# Patient Record
Sex: FEMALE | Race: WHITE | NOT HISPANIC OR LATINO | URBAN - METROPOLITAN AREA
[De-identification: names, ages, dates, MRNs, and addresses within clinical notes are randomized per-mention and may not be internally consistent; named-entity substitution may affect disease eponyms.]

---

## 2019-01-24 ENCOUNTER — EMERGENCY (EMERGENCY)
Facility: HOSPITAL | Age: 49
LOS: 1 days | Discharge: HOME | End: 2019-01-24
Attending: EMERGENCY MEDICINE | Admitting: EMERGENCY MEDICINE
Payer: COMMERCIAL

## 2019-01-24 VITALS
TEMPERATURE: 97 F | OXYGEN SATURATION: 100 % | RESPIRATION RATE: 18 BRPM | DIASTOLIC BLOOD PRESSURE: 75 MMHG | HEART RATE: 90 BPM | SYSTOLIC BLOOD PRESSURE: 145 MMHG

## 2019-01-24 VITALS
TEMPERATURE: 98 F | HEIGHT: 65 IN | DIASTOLIC BLOOD PRESSURE: 91 MMHG | WEIGHT: 145.06 LBS | SYSTOLIC BLOOD PRESSURE: 125 MMHG | RESPIRATION RATE: 18 BRPM | OXYGEN SATURATION: 98 % | HEART RATE: 77 BPM

## 2019-01-24 DIAGNOSIS — E86.0 DEHYDRATION: ICD-10-CM

## 2019-01-24 DIAGNOSIS — R55 SYNCOPE AND COLLAPSE: ICD-10-CM

## 2019-01-24 DIAGNOSIS — K52.9 NONINFECTIVE GASTROENTERITIS AND COLITIS, UNSPECIFIED: ICD-10-CM

## 2019-01-24 PROCEDURE — 85025 COMPLETE CBC W/AUTO DIFF WBC: CPT

## 2019-01-24 PROCEDURE — 85730 THROMBOPLASTIN TIME PARTIAL: CPT

## 2019-01-24 PROCEDURE — 84484 ASSAY OF TROPONIN QUANT: CPT

## 2019-01-24 PROCEDURE — 96365 THER/PROPH/DIAG IV INF INIT: CPT

## 2019-01-24 PROCEDURE — 82962 GLUCOSE BLOOD TEST: CPT

## 2019-01-24 PROCEDURE — 80053 COMPREHEN METABOLIC PANEL: CPT

## 2019-01-24 PROCEDURE — 96375 TX/PRO/DX INJ NEW DRUG ADDON: CPT

## 2019-01-24 PROCEDURE — 85610 PROTHROMBIN TIME: CPT

## 2019-01-24 PROCEDURE — 83690 ASSAY OF LIPASE: CPT

## 2019-01-24 PROCEDURE — 99284 EMERGENCY DEPT VISIT MOD MDM: CPT

## 2019-01-24 PROCEDURE — 99284 EMERGENCY DEPT VISIT MOD MDM: CPT | Mod: 25

## 2019-01-24 PROCEDURE — 83735 ASSAY OF MAGNESIUM: CPT

## 2019-01-24 PROCEDURE — 36415 COLL VENOUS BLD VENIPUNCTURE: CPT

## 2019-01-24 RX ORDER — SODIUM CHLORIDE 9 MG/ML
3000 INJECTION INTRAMUSCULAR; INTRAVENOUS; SUBCUTANEOUS ONCE
Qty: 0 | Refills: 0 | Status: COMPLETED | OUTPATIENT
Start: 2019-01-24 | End: 2019-01-24

## 2019-01-24 RX ORDER — FAMOTIDINE 10 MG/ML
1 INJECTION INTRAVENOUS
Qty: 14 | Refills: 0 | OUTPATIENT
Start: 2019-01-24 | End: 2019-01-30

## 2019-01-24 RX ORDER — FAMOTIDINE 10 MG/ML
20 INJECTION INTRAVENOUS ONCE
Qty: 0 | Refills: 0 | Status: COMPLETED | OUTPATIENT
Start: 2019-01-24 | End: 2019-01-24

## 2019-01-24 RX ORDER — ONDANSETRON 8 MG/1
4 TABLET, FILM COATED ORAL ONCE
Qty: 0 | Refills: 0 | Status: COMPLETED | OUTPATIENT
Start: 2019-01-24 | End: 2019-01-24

## 2019-01-24 RX ORDER — ONDANSETRON 8 MG/1
1 TABLET, FILM COATED ORAL
Qty: 12 | Refills: 0 | OUTPATIENT
Start: 2019-01-24 | End: 2019-01-27

## 2019-01-24 RX ORDER — METOCLOPRAMIDE HCL 10 MG
10 TABLET ORAL ONCE
Qty: 0 | Refills: 0 | Status: COMPLETED | OUTPATIENT
Start: 2019-01-24 | End: 2019-01-24

## 2019-01-24 RX ADMIN — Medication 104 MILLIGRAM(S): at 18:26

## 2019-01-24 RX ADMIN — SODIUM CHLORIDE 1500 MILLILITER(S): 9 INJECTION INTRAMUSCULAR; INTRAVENOUS; SUBCUTANEOUS at 18:16

## 2019-01-24 RX ADMIN — SODIUM CHLORIDE 3000 MILLILITER(S): 9 INJECTION INTRAMUSCULAR; INTRAVENOUS; SUBCUTANEOUS at 18:57

## 2019-01-24 RX ADMIN — ONDANSETRON 4 MILLIGRAM(S): 8 TABLET, FILM COATED ORAL at 19:29

## 2019-01-24 RX ADMIN — Medication 10 MILLIGRAM(S): at 18:57

## 2019-01-24 RX ADMIN — FAMOTIDINE 20 MILLIGRAM(S): 10 INJECTION INTRAVENOUS at 18:26

## 2019-01-24 NOTE — ED PROVIDER NOTE - CARE PLAN
Principal Discharge DX:	Gastroenteritis  Secondary Diagnosis:	Dehydration  Secondary Diagnosis:	Vasovagal syncope

## 2019-01-24 NOTE — ED PROVIDER NOTE - ENMT, MLM
Airway patent, Nasal mucosa clear. Mouth with very dry mucosa. no tongue lac. Throat has no vesicles, no oropharyngeal exudates and uvula is midline.

## 2019-01-24 NOTE — ED ADULT TRIAGE NOTE - CHIEF COMPLAINT QUOTE
"I felt dizzy and passed out around 4PM. I've been having diarrhea and vomiting since this morning."

## 2019-01-24 NOTE — ED PROVIDER NOTE - ATTENDING CONTRIBUTION TO CARE
48F woke up w/ diarrhea and 30 bm today, n/v x5, syncopize in bathroom during vausea. Hot/cold, vision dark, found on floor, out brief perior by  at home. IVF & electrolyte repletion. Dry MM on exam. No focal pain or e/o trauma. unlikely seizure.

## 2019-01-24 NOTE — ED PROVIDER NOTE - NSFOLLOWUPINSTRUCTIONS_ED_ALL_ED_FT
Viral Gastroenteritis, Adult  Viral gastroenteritis is also known as the stomach flu. This condition is caused by certain germs (viruses). These germs can be passed from person to person very easily (are very contagious). This condition can cause sudden watery poop (diarrhea), fever, and throwing up (vomiting).  Having watery poop and throwing up can make you feel weak and cause you to get dehydrated. Dehydration can make you tired and thirsty, make you have a dry mouth, and make it so you pee (urinate) less often. Older adults and people with other diseases or a weak defense system (immune system) are at higher risk for dehydration. It is important to replace the fluids that you lose from having watery poop and throwing up.  Follow these instructions at home:  Follow instructions from your doctor about how to care for yourself at home.  Eating and drinking   Image   Follow these instructions as told by your doctor:  Take an oral rehydration solution (ORS). This is a drink that is sold at pharmacies and stores.  Drink clear fluids in small amounts as you are able, such as:  Water.  Ice chips.  Diluted fruit juice.  Low-calorie sports drinks.  Eat bland, easy-to-digest foods in small amounts as you are able, such as:  Bananas.  Applesauce.  Rice.  Low-fat (lean) meats.  Toast.  Crackers.  Avoid fluids that have a lot of sugar or caffeine in them.  Avoid alcohol.  Avoid spicy or fatty foods.  General instructions   Drink enough fluid to keep your pee (urine) clear or pale yellow.  Wash your hands often. If you cannot use soap and water, use hand .  Make sure that all people in your home wash their hands well and often.  Rest at home while you get better.  Take over-the-counter and prescription medicines only as told by your doctor.  Watch your condition for any changes.  Take a warm bath to help with any burning or pain from having watery poop.  Keep all follow-up visits as told by your doctor. This is important.  Contact a doctor if:  You cannot keep fluids down.  Your symptoms get worse.  You have new symptoms.  You feel light-headed or dizzy.  You have muscle cramps.  Get help right away if:  You have chest pain.  You feel very weak or you pass out (faint).  You see blood in your throw-up.  Your throw-up looks like coffee grounds.  You have bloody or black poop (stools) or poop that look like tar.  You have a very bad headache, a stiff neck, or both.  You have a rash.  You have very bad pain, cramping, or bloating in your belly (abdomen).  You have trouble breathing.  You are breathing very quickly.  Your heart is beating very quickly.  Your skin feels cold and clammy.  You feel confused.  You have pain when you pee.  You have signs of dehydration, such as:  Dark pee, hardly any pee, or no pee.  Cracked lips.  Dry mouth.  Sunken eyes.  Sleepiness.  Weakness.

## 2019-01-24 NOTE — ED PROVIDER NOTE - MEDICAL DECISION MAKING DETAILS
pt w/n/v/d then had vasovagal syncope episode while in bathroom, no sz activity, clinically dehydrated, given ivf and antiemetics and h2 blocker, labs w/hemoconcentration, suspect gastroenteritis resulted in dehydration that caused vasovagal event, no suspicion for card etiology or dissection, abd soft/nt - do not suspect acute intra abd process, pt much improved w/ivf

## 2019-01-24 NOTE — ED ADULT TRIAGE NOTE - OTHER COMPLAINTS
Pt is A&O x3, able to speak coherently in full sentences, no facial droop, no arm drift. Pt C/O headache. Pt denies chest pain, SOB, dizziness at this time.

## 2019-01-24 NOTE — ED PROVIDER NOTE - OBJECTIVE STATEMENT
The pt is a 47 y/o F, BIBA, c/o n/v/d and passed out - pt states that woke up and started to have diarrhea, first loose stools then watery, had ~30 BMs, non bloody, then started to get nauseous and vomited x5, non bloody, non bilious, was in the bathroom and started to feel hot and broke out in cold sweat, vision got dark and she passed out on floor - according to , was out x few min - no sz activity, then came back and now feels like herself.  was sick w/same symptoms a few d ago. Denies fevers, chills, abd pain, cp, sob, h/a, head injury, dysuria, any other c/o

## 2020-01-08 NOTE — ED PROVIDER NOTE - GASTROINTESTINAL, MLM
Abdomen soft, non-tender, no guarding. no rebound Bexarotene Counseling:  I discussed with the patient the risks of bexarotene including but not limited to hair loss, dry lips/skin/eyes, liver abnormalities, hyperlipidemia, pancreatitis, depression/suicidal ideation, photosensitivity, drug rash/allergic reactions, hypothyroidism, anemia, leukopenia, infection, cataracts, and teratogenicity.  Patient understands that they will need regular blood tests to check lipid profile, liver function tests, white blood cell count, thyroid function tests and pregnancy test if applicable.

## 2023-04-09 ENCOUNTER — APPOINTMENT (EMERGENCY)
Dept: RADIOLOGY | Facility: HOSPITAL | Age: 53
DRG: 418 | End: 2023-04-09
Payer: COMMERCIAL

## 2023-04-09 ENCOUNTER — APPOINTMENT (EMERGENCY)
Dept: RADIOLOGY | Facility: HOSPITAL | Age: 53
DRG: 418 | End: 2023-04-09
Attending: EMERGENCY MEDICINE
Payer: COMMERCIAL

## 2023-04-09 ENCOUNTER — HOSPITAL ENCOUNTER (INPATIENT)
Facility: HOSPITAL | Age: 53
LOS: 4 days | Discharge: HOME | DRG: 418 | End: 2023-04-13
Attending: EMERGENCY MEDICINE | Admitting: SURGERY
Payer: COMMERCIAL

## 2023-04-09 DIAGNOSIS — K80.00 ACUTE CALCULOUS CHOLECYSTITIS: Primary | ICD-10-CM

## 2023-04-09 DIAGNOSIS — K81.9 CHOLECYSTITIS: ICD-10-CM

## 2023-04-09 LAB
ALBUMIN SERPL-MCNC: 4.3 G/DL (ref 3.4–5)
ALP SERPL-CCNC: 59 IU/L (ref 35–126)
ALT SERPL-CCNC: 27 IU/L (ref 11–54)
AMORPH CRY #/AREA URNS HPF: 3 /HPF
ANION GAP SERPL CALC-SCNC: 7 MEQ/L (ref 3–15)
AST SERPL-CCNC: 25 IU/L (ref 15–41)
BACTERIA URNS QL MICRO: ABNORMAL /HPF
BASOPHILS # BLD: 0.01 K/UL (ref 0.01–0.1)
BASOPHILS NFR BLD: 0.1 %
BILIRUB SERPL-MCNC: 0.6 MG/DL (ref 0.3–1.2)
BILIRUB UR QL STRIP.AUTO: NEGATIVE MG/DL
BUN SERPL-MCNC: 11 MG/DL (ref 8–20)
CALCIUM SERPL-MCNC: 9.3 MG/DL (ref 8.9–10.3)
CHLORIDE SERPL-SCNC: 98 MEQ/L (ref 98–109)
CLARITY UR REFRACT.AUTO: ABNORMAL
CO2 SERPL-SCNC: 28 MEQ/L (ref 22–32)
COLOR UR AUTO: YELLOW
CREAT SERPL-MCNC: 0.7 MG/DL (ref 0.6–1.1)
DIFFERENTIAL METHOD BLD: ABNORMAL
EOSINOPHIL # BLD: 0.02 K/UL (ref 0.04–0.36)
EOSINOPHIL NFR BLD: 0.2 %
ERYTHROCYTE [DISTWIDTH] IN BLOOD BY AUTOMATED COUNT: 12.6 % (ref 11.7–14.4)
GFR SERPL CREATININE-BSD FRML MDRD: >60 ML/MIN/1.73M*2
GLUCOSE SERPL-MCNC: 130 MG/DL (ref 70–99)
GLUCOSE UR STRIP.AUTO-MCNC: NEGATIVE MG/DL
HCG UR QL: NEGATIVE
HCT VFR BLDCO AUTO: 41.6 % (ref 35–45)
HGB BLD-MCNC: 13.7 G/DL (ref 11.8–15.7)
HGB UR QL STRIP.AUTO: NEGATIVE
HYALINE CASTS #/AREA URNS LPF: ABNORMAL /LPF
IMM GRANULOCYTES # BLD AUTO: 0.03 K/UL (ref 0–0.08)
IMM GRANULOCYTES NFR BLD AUTO: 0.3 %
KETONES UR STRIP.AUTO-MCNC: NEGATIVE MG/DL
LEUKOCYTE ESTERASE UR QL STRIP.AUTO: NEGATIVE
LIPASE SERPL-CCNC: 39 U/L (ref 20–51)
LYMPHOCYTES # BLD: 1.49 K/UL (ref 1.2–3.5)
LYMPHOCYTES NFR BLD: 13.2 %
MCH RBC QN AUTO: 29.3 PG (ref 28–33.2)
MCHC RBC AUTO-ENTMCNC: 32.9 G/DL (ref 32.2–35.5)
MCV RBC AUTO: 88.9 FL (ref 83–98)
MONOCYTES # BLD: 0.49 K/UL (ref 0.28–0.8)
MONOCYTES NFR BLD: 4.4 %
MUCOUS THREADS URNS QL MICRO: ABNORMAL /LPF
NEUTROPHILS # BLD: 9.21 K/UL (ref 1.7–7)
NEUTS SEG NFR BLD: 81.8 %
NITRITE UR QL STRIP.AUTO: NEGATIVE
NRBC BLD-RTO: 0 %
PDW BLD AUTO: 9.5 FL (ref 9.4–12.3)
PH UR STRIP.AUTO: 7.5 [PH]
PLATELET # BLD AUTO: 257 K/UL (ref 150–369)
POTASSIUM SERPL-SCNC: 3.7 MEQ/L (ref 3.6–5.1)
PROT SERPL-MCNC: 7.8 G/DL (ref 6–8.2)
PROT UR QL STRIP.AUTO: ABNORMAL
RBC # BLD AUTO: 4.68 M/UL (ref 3.93–5.22)
RBC #/AREA URNS HPF: ABNORMAL /HPF
SODIUM SERPL-SCNC: 133 MEQ/L (ref 136–144)
SP GR UR REFRACT.AUTO: 1.01
SQUAMOUS URNS QL MICRO: ABNORMAL /HPF
UROBILINOGEN UR STRIP-ACNC: 0.2 EU/DL
WBC # BLD AUTO: 11.25 K/UL (ref 3.8–10.5)
WBC #/AREA URNS HPF: ABNORMAL /HPF

## 2023-04-09 PROCEDURE — 84703 CHORIONIC GONADOTROPIN ASSAY: CPT

## 2023-04-09 PROCEDURE — 81003 URINALYSIS AUTO W/O SCOPE: CPT

## 2023-04-09 PROCEDURE — 12000000 HC ROOM AND CARE MED/SURG

## 2023-04-09 PROCEDURE — 93005 ELECTROCARDIOGRAM TRACING: CPT

## 2023-04-09 PROCEDURE — 63600105 HC IODINE BASED CONTRAST

## 2023-04-09 PROCEDURE — 82040 ASSAY OF SERUM ALBUMIN: CPT | Performed by: EMERGENCY MEDICINE

## 2023-04-09 PROCEDURE — 76705 ECHO EXAM OF ABDOMEN: CPT

## 2023-04-09 PROCEDURE — 85025 COMPLETE CBC W/AUTO DIFF WBC: CPT | Performed by: EMERGENCY MEDICINE

## 2023-04-09 PROCEDURE — 36415 COLL VENOUS BLD VENIPUNCTURE: CPT | Performed by: EMERGENCY MEDICINE

## 2023-04-09 PROCEDURE — 96374 THER/PROPH/DIAG INJ IV PUSH: CPT | Mod: 59

## 2023-04-09 PROCEDURE — 63600000 HC DRUGS/DETAIL CODE: Performed by: SURGERY

## 2023-04-09 PROCEDURE — 96375 TX/PRO/DX INJ NEW DRUG ADDON: CPT | Mod: 59

## 2023-04-09 PROCEDURE — G1004 CDSM NDSC: HCPCS

## 2023-04-09 PROCEDURE — 63600000 HC DRUGS/DETAIL CODE

## 2023-04-09 PROCEDURE — 80053 COMPREHEN METABOLIC PANEL: CPT | Performed by: EMERGENCY MEDICINE

## 2023-04-09 PROCEDURE — 83690 ASSAY OF LIPASE: CPT | Performed by: EMERGENCY MEDICINE

## 2023-04-09 PROCEDURE — 25800000 HC PHARMACY IV SOLUTIONS

## 2023-04-09 PROCEDURE — 99285 EMERGENCY DEPT VISIT HI MDM: CPT | Mod: 25

## 2023-04-09 RX ORDER — KETOROLAC TROMETHAMINE 15 MG/ML
15 INJECTION, SOLUTION INTRAMUSCULAR; INTRAVENOUS ONCE
Status: COMPLETED | OUTPATIENT
Start: 2023-04-09 | End: 2023-04-09

## 2023-04-09 RX ORDER — ONDANSETRON HYDROCHLORIDE 2 MG/ML
4 INJECTION, SOLUTION INTRAVENOUS ONCE
Status: COMPLETED | OUTPATIENT
Start: 2023-04-09 | End: 2023-04-10

## 2023-04-09 RX ORDER — ONDANSETRON HYDROCHLORIDE 2 MG/ML
4 INJECTION, SOLUTION INTRAVENOUS EVERY 8 HOURS PRN
Status: DISCONTINUED | OUTPATIENT
Start: 2023-04-09 | End: 2023-04-12

## 2023-04-09 RX ORDER — DEXTROSE 50 % IN WATER (D50W) INTRAVENOUS SYRINGE
25 AS NEEDED
Status: DISCONTINUED | OUTPATIENT
Start: 2023-04-09 | End: 2023-04-13 | Stop reason: HOSPADM

## 2023-04-09 RX ORDER — IBUPROFEN 200 MG
16-32 TABLET ORAL AS NEEDED
Status: DISCONTINUED | OUTPATIENT
Start: 2023-04-09 | End: 2023-04-13 | Stop reason: HOSPADM

## 2023-04-09 RX ORDER — MORPHINE SULFATE 2 MG/ML
2 INJECTION, SOLUTION INTRAMUSCULAR; INTRAVENOUS ONCE
Status: COMPLETED | OUTPATIENT
Start: 2023-04-09 | End: 2023-04-10

## 2023-04-09 RX ORDER — ACETAMINOPHEN 325 MG/1
650 TABLET ORAL EVERY 4 HOURS PRN
Status: DISCONTINUED | OUTPATIENT
Start: 2023-04-09 | End: 2023-04-13 | Stop reason: HOSPADM

## 2023-04-09 RX ORDER — DEXTROSE MONOHYDRATE AND SODIUM CHLORIDE 5; .9 G/100ML; G/100ML
INJECTION, SOLUTION INTRAVENOUS CONTINUOUS
Status: ACTIVE | OUTPATIENT
Start: 2023-04-09 | End: 2023-04-11

## 2023-04-09 RX ORDER — HYDROMORPHONE HYDROCHLORIDE 1 MG/ML
0.25 INJECTION, SOLUTION INTRAMUSCULAR; INTRAVENOUS; SUBCUTANEOUS
Status: DISCONTINUED | OUTPATIENT
Start: 2023-04-09 | End: 2023-04-13 | Stop reason: HOSPADM

## 2023-04-09 RX ORDER — METRONIDAZOLE 500 MG/100ML
500 INJECTION, SOLUTION INTRAVENOUS
Status: DISCONTINUED | OUTPATIENT
Start: 2023-04-09 | End: 2023-04-13

## 2023-04-09 RX ORDER — ONDANSETRON HYDROCHLORIDE 2 MG/ML
4 INJECTION, SOLUTION INTRAVENOUS ONCE
Status: COMPLETED | OUTPATIENT
Start: 2023-04-09 | End: 2023-04-09

## 2023-04-09 RX ORDER — DEXTROSE 40 %
15-30 GEL (GRAM) ORAL AS NEEDED
Status: DISCONTINUED | OUTPATIENT
Start: 2023-04-09 | End: 2023-04-13 | Stop reason: HOSPADM

## 2023-04-09 RX ORDER — METRONIDAZOLE 500 MG/100ML
500 INJECTION, SOLUTION INTRAVENOUS ONCE
Status: DISCONTINUED | OUTPATIENT
Start: 2023-04-09 | End: 2023-04-09

## 2023-04-09 RX ADMIN — KETOROLAC TROMETHAMINE 15 MG: 15 INJECTION, SOLUTION INTRAMUSCULAR; INTRAVENOUS at 18:53

## 2023-04-09 RX ADMIN — SODIUM CHLORIDE 1000 ML: 9 INJECTION, SOLUTION INTRAVENOUS at 18:53

## 2023-04-09 RX ADMIN — HYDROMORPHONE HYDROCHLORIDE 0.25 MG: 1 INJECTION, SOLUTION INTRAMUSCULAR; INTRAVENOUS; SUBCUTANEOUS at 22:29

## 2023-04-09 RX ADMIN — IOHEXOL 100 ML: 350 INJECTION, SOLUTION INTRAVENOUS at 19:48

## 2023-04-09 RX ADMIN — ONDANSETRON 4 MG: 2 INJECTION INTRAMUSCULAR; INTRAVENOUS at 18:58

## 2023-04-09 ASSESSMENT — ENCOUNTER SYMPTOMS
ABDOMINAL PAIN: 1
BACK PAIN: 1
HEMATURIA: 0
ABDOMINAL DISTENTION: 1
CHILLS: 0
FEVER: 0
DYSURIA: 0
FLANK PAIN: 0
VOMITING: 1
SHORTNESS OF BREATH: 0
DIARRHEA: 0
FREQUENCY: 0
NAUSEA: 1
COLOR CHANGE: 0

## 2023-04-09 NOTE — ED ATTESTATION NOTE
Procedures  Physical Exam  Review of Systems    2023  6:08 PM  I have personally seen and examined the patient.  I personally performed the key components of the encounter and provided a substantive portion of the care and medical decision making for this patient. I reviewed and agree with the PA/NP/Resident's assessment and plan of care, with any exceptions as documented below    My focused history, examination, assessment, and plan of care of Karon Moore is as follows:    The patient is a 52 y.o. who comes to the ED for abdominal pain, nausea, vomiting.  No diarrhea but did feel some constipation today.  No fever, chills.  Remote history of  but no other intra-abdominal surgeries.  Pain was initially in the epigastric area but now more in the right upper quadrant.  Did not eat anything unusual.  Spouse ate similar food yesterday and is asymptomatic.  Patient also did travel to [facility] about 2 weeks ago but was asymptomatic from that trip.  No chest pain, difficulty breathing.      Pertinent past medical history includes: Remote history of breast cancer      Exam: Awake, alert, uncomfortable but nontoxic-appearing.  Afebrile.  Heart rate regular.  Abdomen soft, flat, mildly tender to palpation in the right upper quadrant without a Rodarte's.  No McBurney's.  No peritoneal findings.      Impression/Plan/Medical decision making/ED course: CT with gallstones and early cholecystitis.  Ultrasound shows cholecystitis as well.  Antibiotics ordered.  Results discussed with general surgery who admit to their service.             Vital Signs Review: Vital signs have been reviewed. The oxygen saturation is SpO2: 97 %  which is normal.      I was physically present for the key/critical portions of the following procedures: None    NOTE: Patient seen during a time of significantly increased volumes, decreased capacity and staff. Portion of management and initial evaluation may have been done while in the waiting  room because of this. This document was created using dragon dictation software.  There might be some typographical errors due to this technology.         Marcie Laura MD  04/09/23 0915

## 2023-04-09 NOTE — ED PROVIDER NOTES
Emergency Medicine Note  HPI   HISTORY OF PRESENT ILLNESS       History provided by:  Patient and medical records   used: No      52-year-old female with PMH of breast cancer s/p mastectomy presents to the ED via private vehicle for evaluation of abdominal pain x1 day.  Patient reports 4 episodes of vomiting.  No blood.  She states she feels diffuse abdominal discomfort and bloatingwith associated low back pain.  History of .  Denies fever, chest pain, shortness of breath, diarrhea, dysuria, hematuria, flank pain, frequency, vaginal bleeding, vaginal discharge.  No bowel/bladder incontinence.  No saddle anesthesia.  Patient is menopausal.      Patient History   PAST HISTORY     Reviewed from Nursing Triage:  Tobacco  Allergies  Meds  Problems  Med Hx  Surg Hx  Fam Hx  Soc   Hx      Past Medical History:   Diagnosis Date   • Breast cancer (CMS/HCC)        Past Surgical History:   Procedure Laterality Date   •  SECTION     • MASTECTOMY Bilateral        History reviewed. No pertinent family history.    Social History     Tobacco Use   • Smoking status: Never   • Smokeless tobacco: Never   Substance Use Topics   • Alcohol use: Yes     Comment: social   • Drug use: Never         Review of Systems   REVIEW OF SYSTEMS     Review of Systems   Constitutional: Negative for chills and fever.   Respiratory: Negative for shortness of breath.    Cardiovascular: Negative for chest pain.   Gastrointestinal: Positive for abdominal distention, abdominal pain, nausea and vomiting. Negative for diarrhea.   Genitourinary: Negative for dysuria, flank pain, frequency, hematuria, vaginal bleeding and vaginal discharge.   Musculoskeletal: Positive for back pain.   Skin: Negative for color change.         VITALS     ED Vitals    Date/Time Temp Pulse Resp BP SpO2 [facility]   23 1858 -- 60 17 150/94 97 % W   23 1804 36.9 °C (98.5 °F) 72 18 171/93 97 % LC        Pulse Ox %: 97 % (23  1809)  Pulse Ox Interpretation: Normal (04/09/23 1809)  Heart Rate: 72 (04/09/23 1809)  Rhythm Strip Interpretation: Normal Sinus Rhythm (04/09/23 1833)     Physical Exam   PHYSICAL EXAM     Physical Exam  Vitals and nursing note reviewed.   Constitutional:       General: She is not in acute distress.     Appearance: She is well-developed. She is not toxic-appearing.   HENT:      Head: Normocephalic and atraumatic.   Eyes:      Conjunctiva/sclera: Conjunctivae normal.   Cardiovascular:      Rate and Rhythm: Normal rate and regular rhythm.   Pulmonary:      Effort: Pulmonary effort is normal. No respiratory distress.   Abdominal:      General: Bowel sounds are normal. There is no distension.      Palpations: Abdomen is soft.      Tenderness: There is generalized abdominal tenderness. There is no right CVA tenderness, left CVA tenderness, guarding or rebound.   Musculoskeletal:         General: No deformity.      Comments: No vertebral tenderness.  No step-off/deformity.  No crepitus.   Skin:     General: Skin is warm and dry.   Neurological:      General: No focal deficit present.      Mental Status: She is alert and oriented to person, place, and time.   Psychiatric:         Mood and Affect: Mood normal.         Behavior: Behavior normal.           PROCEDURES     Procedures     DATA     Results     Procedure Component Value Units Date/Time    BhCG, Serum, Qual [[id number]] Collected: 04/09/23 1809    Specimen: Blood, Venous Updated: 04/09/23 1900    UA with reflex culture [[id number]]  (Abnormal) Collected: 04/09/23 1827    Specimen: Urine, Clean Catch Updated: 04/09/23 1842    Narrative:      The following orders were created for panel order UA with reflex culture.  Procedure                               Abnormality         Status                     ---------                               -----------         ------                     UA Reflex to Culture (Ma...[[id number]]  Abnormal            Final result                UA Microscopic[[id number]]               Abnormal            Final result                 Please view results for these tests on the individual orders.    UA Microscopic [[id number]]  (Abnormal) Collected: 04/09/23 1827    Specimen: Urine, Clean Catch Updated: 04/09/23 1842     RBC, Urine 0 TO 4 /HPF      WBC, Urine 0 TO 3 /HPF      Squamous Epithelial Rare /hpf      Hyaline Cast None Seen /lpf      Bacteria, Urine None Seen /HPF      AMORPHOUS CRYSTALS +3 /hpf      Mucus Rare /LPF     UA Reflex to Culture (Macroscopic) [[id number]]  (Abnormal) Collected: 04/09/23 1827    Specimen: Urine, Clean Catch Updated: 04/09/23 1840     Color, Urine Yellow     Clarity, Urine Cloudy     Specific Gravity, Urine 1.015     pH, Urine 7.5     Leukocyte Esterase Negative     Comment: Results can be falsely negative due to high specific gravity, some antibiotics, glucose >3 g/dl, or WBC other than neutrophils.        Nitrite, Urine Negative     Protein, Urine Trace     Comment: Trace False Positive Protein can be seen with alkaline or highly buffered urines or urine with high specific gravity. Suggest clinical correlation.        Glucose, Urine Negative mg/dL      Ketones, Urine Negative mg/dL      Urobilinogen, Urine 0.2 EU/dL      Bilirubin, Urine Negative mg/dL      Blood, Urine Negative     Comment: The sensitivity of the occult blood test is equivalent to approximately 4 intact RBC/HPF.       Comprehensive metabolic panel [[id number]]  (Abnormal) Collected: 04/09/23 1809    Specimen: Blood, Venous Updated: 04/09/23 1835     Sodium 133 mEQ/L      Potassium 3.7 mEQ/L      Comment: Results obtained on plasma. Plasma Potassium values may be up to 0.4 mEQ/L less than serum values. The differences may be greater for patients with high platelet or white cell counts.        Chloride 98 mEQ/L      CO2 28 mEQ/L      BUN 11 mg/dL      Creatinine 0.7 mg/dL      Glucose 130 mg/dL      Calcium 9.3 mg/dL      AST (SGOT) 25 IU/L      ALT  (SGPT) 27 IU/L      Alkaline Phosphatase 59 IU/L      Total Protein 7.8 g/dL      Comment: Test performed on plasma which typically contains approximately 0.4 g/dL more protein than serum.        Albumin 4.3 g/dL      Bilirubin, Total 0.6 mg/dL      eGFR >60.0 mL/min/1.73m*2      Anion Gap 7 mEQ/L     Narrative:      Order only if pain is above umbilicus    Lipase, if pain is above umbilicus [[id number]]  (Normal) Collected: 04/09/23 1809    Specimen: Blood, Venous Updated: 04/09/23 1835     Lipase 39 U/L     Narrative:      Order only if pain is above umbilicus    CBC and differential [[id number]]  (Abnormal) Collected: 04/09/23 1809    Specimen: Blood, Venous Updated: 04/09/23 1816     WBC 11.25 K/uL      RBC 4.68 M/uL      Hemoglobin 13.7 g/dL      Hematocrit 41.6 %      MCV 88.9 fL      MCH 29.3 pg      MCHC 32.9 g/dL      RDW 12.6 %      Platelets 257 K/uL      MPV 9.5 fL      Differential Type Auto     nRBC 0.0 %      Immature Granulocytes 0.3 %      Neutrophils 81.8 %      Lymphocytes 13.2 %      Monocytes 4.4 %      Eosinophils 0.2 %      Basophils 0.1 %      Immature Granulocytes, Absolute 0.03 K/uL      Neutrophils, Absolute 9.21 K/uL      Lymphocytes, Absolute 1.49 K/uL      Monocytes, Absolute 0.49 K/uL      Eosinophils, Absolute 0.02 K/uL      Basophils, Absolute 0.01 K/uL     RAINBOW RED [[id number]] Collected: 04/09/23 1809    Specimen: Blood, Venous Updated: 04/09/23 1813    RAINBOW LT BLUE [[id number]] Collected: 04/09/23 1809    Specimen: Blood, Venous Updated: 04/09/23 1813    Rea Draw Panel [[id number]] Collected: 04/09/23 1809    Specimen: Blood, Venous Updated: 04/09/23 1813    Narrative:      The following orders were created for panel order Rea Draw Panel.  Procedure                               Abnormality         Status                     ---------                               -----------         ------                     RAINBOW RED[[id number]]                                      In  process                 BRYANT NUÑEZ[[id number]]                                  In process                 BRYANT KENA[[id number]]                                     In process                   Please view results for these tests on the individual orders.    RAINBOW GOLD [[id number]] Collected: 04/09/23 1809    Specimen: Blood, Venous Updated: 04/09/23 1813          Imaging Results    None         ECG 12 lead   ED Interpretation   Interpreted by myself and ED attending.  NSR.  Rate 71 bpm.  No STEMI.          Scoring tools                                  ED Course & MDM   MDM / ED COURSE / CLINICAL IMPRESSION / DISPO     Medical Decision Making  52-year-old female with complaint of generalized abdominal pain and vomiting.  Will check labs and imaging.  Consider gastritis, gastroenteritis, bowel obstruction, cholecystitis, appendicitis, diverticulitis.  EKG nonischemic.  Blood work notable for mildly elevated WBC.    Amount and/or Complexity of Data Reviewed  Labs: ordered. Decision-making details documented in ED Course.  Radiology: ordered.  ECG/medicine tests: ordered and independent interpretation performed.      Risk  Prescription drug management.          ED Course as of 04/09/23 2213   Sun Apr 09, 2023   1822 WBC(!): 11.25 [SG]   1835 Patient declines pain/nausea meds at this time [SG]   1835 Impression: Abdominal pain    Plan: Labs, Lipase, UA, EKG, Imaging, monitor [SG]   1942 Sign out to ED attending who will follow up on CT result and make appropriate disposition [SG]   2056 CT with cholelithiasis with early cholecystitis.  Patient notes significant proved in symptoms and wants to go home however after discussion of CT results, patient agreeable to stay for further evaluation.  Right upper quadrant ultrasound ordered.  Will discuss with general surgery following this. [DW]   2211 US with cholecystitis; gen surg paged. Abx ordered. Pain returning; pt agreeable to morphine now. [DW]   2212 Discussed  with Dr Rangel for admission [DW]      ED Course User Index  [DW] Marcie Laura MD  [SG] Awilda Fry PA C     Clinical Impression      None               Awilda Fry PA C  04/09/23 1948

## 2023-04-10 LAB
ALBUMIN SERPL-MCNC: 3.7 G/DL (ref 3.4–5)
ALP SERPL-CCNC: 54 IU/L (ref 35–126)
ALT SERPL-CCNC: 25 IU/L (ref 11–54)
ANION GAP SERPL CALC-SCNC: 6 MEQ/L (ref 3–15)
AST SERPL-CCNC: 28 IU/L (ref 15–41)
ATRIAL RATE: 71
BILIRUB SERPL-MCNC: 0.3 MG/DL (ref 0.3–1.2)
BUN SERPL-MCNC: 7 MG/DL (ref 8–20)
CALCIUM SERPL-MCNC: 8.4 MG/DL (ref 8.9–10.3)
CHLORIDE SERPL-SCNC: 106 MEQ/L (ref 98–109)
CO2 SERPL-SCNC: 24 MEQ/L (ref 22–32)
CREAT SERPL-MCNC: 0.6 MG/DL (ref 0.6–1.1)
ERYTHROCYTE [DISTWIDTH] IN BLOOD BY AUTOMATED COUNT: 12.8 % (ref 11.7–14.4)
GFR SERPL CREATININE-BSD FRML MDRD: >60 ML/MIN/1.73M*2
GLUCOSE SERPL-MCNC: 120 MG/DL (ref 70–99)
HCT VFR BLDCO AUTO: 40.1 % (ref 35–45)
HGB BLD-MCNC: 13.1 G/DL (ref 11.8–15.7)
MCH RBC QN AUTO: 29.3 PG (ref 28–33.2)
MCHC RBC AUTO-ENTMCNC: 32.7 G/DL (ref 32.2–35.5)
MCV RBC AUTO: 89.7 FL (ref 83–98)
P AXIS: 60
PDW BLD AUTO: 9.5 FL (ref 9.4–12.3)
PLATELET # BLD AUTO: 215 K/UL (ref 150–369)
POTASSIUM SERPL-SCNC: 3.3 MEQ/L (ref 3.6–5.1)
PR INTERVAL: 158
PROT SERPL-MCNC: 6.3 G/DL (ref 6–8.2)
QRS DURATION: 92
QT INTERVAL: 412
QTC CALCULATION(BAZETT): 447
R AXIS: 54
RBC # BLD AUTO: 4.47 M/UL (ref 3.93–5.22)
SODIUM SERPL-SCNC: 136 MEQ/L (ref 136–144)
T WAVE AXIS: 56
VENTRICULAR RATE: 71
WBC # BLD AUTO: 10.02 K/UL (ref 3.8–10.5)

## 2023-04-10 PROCEDURE — 25000000 HC PHARMACY GENERAL: Performed by: SURGERY

## 2023-04-10 PROCEDURE — 25800000 HC PHARMACY IV SOLUTIONS: Performed by: SURGERY

## 2023-04-10 PROCEDURE — 85027 COMPLETE CBC AUTOMATED: CPT | Performed by: SURGERY

## 2023-04-10 PROCEDURE — 36415 COLL VENOUS BLD VENIPUNCTURE: CPT | Performed by: SURGERY

## 2023-04-10 PROCEDURE — 63600000 HC DRUGS/DETAIL CODE: Performed by: EMERGENCY MEDICINE

## 2023-04-10 PROCEDURE — 63700000 HC SELF-ADMINISTRABLE DRUG: Performed by: SURGERY

## 2023-04-10 PROCEDURE — 80053 COMPREHEN METABOLIC PANEL: CPT | Performed by: SURGERY

## 2023-04-10 PROCEDURE — 63600000 HC DRUGS/DETAIL CODE: Performed by: SURGERY

## 2023-04-10 PROCEDURE — 12000000 HC ROOM AND CARE MED/SURG

## 2023-04-10 RX ORDER — KETOROLAC TROMETHAMINE 30 MG/ML
30 INJECTION, SOLUTION INTRAMUSCULAR; INTRAVENOUS ONCE AS NEEDED
Status: COMPLETED | OUTPATIENT
Start: 2023-04-10 | End: 2023-04-10

## 2023-04-10 RX ORDER — DOCUSATE SODIUM 100 MG/1
100 CAPSULE, LIQUID FILLED ORAL DAILY
Status: DISCONTINUED | OUTPATIENT
Start: 2023-04-11 | End: 2023-04-10

## 2023-04-10 RX ORDER — BUTALBITAL, ACETAMINOPHEN AND CAFFEINE 50; 325; 40 MG/1; MG/1; MG/1
1 TABLET ORAL ONCE
Status: COMPLETED | OUTPATIENT
Start: 2023-04-10 | End: 2023-04-10

## 2023-04-10 RX ORDER — POTASSIUM CHLORIDE 750 MG/1
40 TABLET, EXTENDED RELEASE ORAL ONCE
Status: COMPLETED | OUTPATIENT
Start: 2023-04-10 | End: 2023-04-10

## 2023-04-10 RX ORDER — DOCUSATE SODIUM 100 MG/1
100 CAPSULE, LIQUID FILLED ORAL DAILY
Status: DISCONTINUED | OUTPATIENT
Start: 2023-04-10 | End: 2023-04-11

## 2023-04-10 RX ADMIN — DEXTROSE AND SODIUM CHLORIDE: 5; 900 INJECTION, SOLUTION INTRAVENOUS at 00:30

## 2023-04-10 RX ADMIN — ONDANSETRON 4 MG: 2 INJECTION INTRAMUSCULAR; INTRAVENOUS at 00:17

## 2023-04-10 RX ADMIN — POTASSIUM CHLORIDE 40 MEQ: 750 TABLET, EXTENDED RELEASE ORAL at 11:09

## 2023-04-10 RX ADMIN — MORPHINE SULFATE 2 MG: 2 INJECTION, SOLUTION INTRAMUSCULAR; INTRAVENOUS at 00:17

## 2023-04-10 RX ADMIN — ONDANSETRON HYDROCHLORIDE 4 MG: 2 SOLUTION INTRAMUSCULAR; INTRAVENOUS at 08:56

## 2023-04-10 RX ADMIN — METRONIDAZOLE 500 MG: 500 INJECTION, SOLUTION INTRAVENOUS at 08:18

## 2023-04-10 RX ADMIN — HYDROMORPHONE HYDROCHLORIDE 0.25 MG: 1 INJECTION, SOLUTION INTRAMUSCULAR; INTRAVENOUS; SUBCUTANEOUS at 14:54

## 2023-04-10 RX ADMIN — HYDROMORPHONE HYDROCHLORIDE 0.25 MG: 1 INJECTION, SOLUTION INTRAMUSCULAR; INTRAVENOUS; SUBCUTANEOUS at 11:17

## 2023-04-10 RX ADMIN — CEFTRIAXONE SODIUM 1 G: 1 INJECTION, POWDER, FOR SOLUTION INTRAMUSCULAR; INTRAVENOUS at 21:50

## 2023-04-10 RX ADMIN — BUTALBITAL, ACETAMINOPHEN, AND CAFFEINE 1 TABLET: 50; 325; 40 TABLET ORAL at 16:05

## 2023-04-10 RX ADMIN — ONDANSETRON HYDROCHLORIDE 4 MG: 2 SOLUTION INTRAMUSCULAR; INTRAVENOUS at 16:56

## 2023-04-10 RX ADMIN — HYDROMORPHONE HYDROCHLORIDE 0.25 MG: 1 INJECTION, SOLUTION INTRAMUSCULAR; INTRAVENOUS; SUBCUTANEOUS at 02:33

## 2023-04-10 RX ADMIN — HYDROMORPHONE HYDROCHLORIDE 0.25 MG: 1 INJECTION, SOLUTION INTRAMUSCULAR; INTRAVENOUS; SUBCUTANEOUS at 21:48

## 2023-04-10 RX ADMIN — HYDROMORPHONE HYDROCHLORIDE 0.25 MG: 1 INJECTION, SOLUTION INTRAMUSCULAR; INTRAVENOUS; SUBCUTANEOUS at 05:52

## 2023-04-10 RX ADMIN — CEFTRIAXONE SODIUM 1 G: 1 INJECTION, POWDER, FOR SOLUTION INTRAMUSCULAR; INTRAVENOUS at 00:31

## 2023-04-10 RX ADMIN — KETOROLAC TROMETHAMINE 30 MG: 30 INJECTION, SOLUTION INTRAMUSCULAR; INTRAVENOUS at 08:52

## 2023-04-10 RX ADMIN — METRONIDAZOLE 500 MG: 500 INJECTION, SOLUTION INTRAVENOUS at 01:04

## 2023-04-10 RX ADMIN — HYDROMORPHONE HYDROCHLORIDE 0.25 MG: 1 INJECTION, SOLUTION INTRAMUSCULAR; INTRAVENOUS; SUBCUTANEOUS at 02:12

## 2023-04-10 RX ADMIN — METRONIDAZOLE 500 MG: 500 INJECTION, SOLUTION INTRAVENOUS at 16:58

## 2023-04-10 ASSESSMENT — COGNITIVE AND FUNCTIONAL STATUS - GENERAL
STANDING UP FROM CHAIR USING ARMS: 4 - NONE
WALKING IN HOSPITAL ROOM: 4 - NONE
MOVING TO AND FROM BED TO CHAIR: 4 - NONE
MOVING TO AND FROM BED TO CHAIR: 4 - NONE
STANDING UP FROM CHAIR USING ARMS: 4 - NONE
CLIMB 3 TO 5 STEPS WITH RAILING: 4 - NONE
CLIMB 3 TO 5 STEPS WITH RAILING: 4 - NONE
WALKING IN HOSPITAL ROOM: 4 - NONE

## 2023-04-10 NOTE — H&P
General Surgery Admission H & P    Subjective     Karon Moore is a 52 y.o. female visiting from [facility] with a past medical history including microinvasive lymph node positive, ER/IN pos invasive ductal/lobular breast cancer, for which she underwent bilateral mastectomy, chemotherapy, and adjuvant endocrine therapy (currently on tamoxifen) 8 years ago at [facility], who developed generalized abdominal pain, discomfort, bloating, vomiting which then became more of a band-like pressure across her upper abdomen starting 2 days ago and progressively worsening. She ultimately came to the ED where she was found to have mild leukocytosis and signs of early acute calculous cholecystitis on both CT a/p and ultrasound. She continues to have discomfort this a.m. but is presently controlled with pain meds. She has had previous similar symptoms.    Medical History:   Past Medical History:   Diagnosis Date   • Breast cancer (CMS/HCC)        Surgical History:   Past Surgical History:   Procedure Laterality Date   •  SECTION     • MASTECTOMY Bilateral        Social History:   Social History     Social History Narrative   • Not on file       Family History: History reviewed. No pertinent family history. 3 sisters and both parents have had gallbladders removed.    Allergies: Sulfa (sulfonamide antibiotics)    Home Medications:      Current Medications:  •  acetaminophen, 650 mg, oral, q4h PRN  •  cefTRIAXone, 1 g, intravenous, q24h INT  •  D5 % and 0.9 % sodium chloride, , intravenous, Continuous  •  glucose, 16-32 g of dextrose, oral, PRN **OR** dextrose, 15-30 g of dextrose, oral, PRN **OR** glucagon, 1 mg, intramuscular, PRN **OR** dextrose 50 % in water (D50), 25 mL, intravenous, PRN  •  HYDROmorphone, 0.25 mg, intravenous, q3h PRN  •  metroNIDAZOLE, 500 mg, intravenous, q8h INT  •  ondansetron, 4 mg, intravenous, q8h PRN  •  potassium chloride, 40 mEq, oral, Once    Review of Systems  Pertinent items are noted  "in HPI.    Objective     Physicial Exam  Visit Vitals  /71 (BP Location: Left upper arm, Patient Position: Lying)   Pulse 75   Temp 36.7 °C (98.1 °F) (Oral)   Resp 18   Ht 1.626 m (5' 4\")   Wt 69.4 kg (153 lb)   SpO2 96%   Breastfeeding No   BMI 26.26 kg/m²       General appearance: alert, appears stated age and cooperative. Well developed, well nourished middle aged woman in no acute distress or obvious discomfort.  Head: normocephalic, without obvious abnormality, atraumatic  Eyes: conjunctivae/corneas clear. PERRL, EOM's grossly intact. Sclerae nonicteric.  Neck: trachea midline   Lungs: Respirations nonlabored. No tachypnea, cough, use of accessory respiratory muscles.   Abdomen: Soft, mildly distended. Mild tenderness to palpation epigastrium and RUQ. No guarding, rebound.   Rectal: deferred  Extremities: extremities normal, warm and well-perfused; no cyanosis, clubbing, or edema  Skin: Skin color, texture, turgor normal. No rashes or lesions  Neurologic: Grossly normal    Labs  CBC Results       04/10/23 04/09/23     0344 1809    WBC 10.02 11.25    RBC 4.47 4.68    HGB 13.1 13.7    HCT 40.1 41.6    MCV 89.7 88.9    MCH 29.3 29.3    MCHC 32.7 32.9     257        CMP Results       04/10/23 04/09/23     0344 1809     133    K 3.3 3.7    Cl 106 98    CO2 24 28    Glucose 120 130    BUN 7 11    Creatinine 0.6 0.7    Calcium 8.4 9.3    Anion Gap 6 7    AST 28 25    ALT 25 27    Albumin 3.7 4.3    EGFR >60.0 >60.0         Comment for K at 1809 on 04/09/23: Results obtained on plasma. Plasma Potassium values may be up to 0.4 mEQ/L less than serum values. The differences may be greater for patients with high platelet or white cell counts.            Imaging  U/s:   IMPRESSION:   Cholelithiasis with constellation of findings highly suggestive of acute   cholecystitis.                                   CT a/p:   IMPRESSION:   1. Cholelithiasis with suspected early cholecystitis.   2. Suggestion of 1.4 " cm right ovarian cyst, likely representing a residual   physiologic follicle, but should be correlated with precautionary surveillance   pelvic ultrasound.       Assessment and Plan  Acute calculous cholecystitis with continued symptoms.   -- IV abx.  -- To OR today for lap champ.    Ashlie Rangel MD

## 2023-04-10 NOTE — PLAN OF CARE
Problem: Adult Inpatient Plan of Care  Goal: Plan of Care Review  Flowsheets (Taken 4/10/2023 1410)  Progress: improving  Plan of Care Reviewed With: patient  Outcome Summary: NPO for OR this afternoon. continues on IVF.  at bedside - updated on plan of care. Continues on IV ABT. IV toradol and iv dilaudid administered for pain, iv zofran administered for nausea with a positive effect.   Plan of Care Review  Plan of Care Reviewed With: patient  Progress: improving  Outcome Summary: NPO for OR this afternoon. continues on IVF.  at bedside - updated on plan of care. Continues on IV ABT. IV toradol and iv dilaudid administered for pain, iv zofran administered for nausea with a positive effect.

## 2023-04-10 NOTE — PLAN OF CARE
Care Coordination Admission Assessment Note  Date: 4/10/2023   Time: 3:24 PM    Patient Name: Karon Moore  Medical Record Number: [id number]  YOB: 1970  Room/BED: 0323D    General Information  Patient-Specific Goals (Include Timeframe)     PCP: [facility], No Pcp   Insurance Coverage: AETNA  Readmission Within the last 30 days  no previous admission in last 30 days    Advance Directives (for Healthcare)?  Does patient have advance directive?: No  Patient does not have Advance Directive: Patient/Family declines further information  Does patient have current OOH DNR form?: No  Patient does not have current OOH DNR form: Patient/Family declines further information  Does patient have current POLST?: No  Patient does not have current POLST: Patient/Family declines further information  Does patient have mental health advance directive?: No  Patient does not have Mental Health Advance Directive: Patient/Family declines further information       Living Arrangements  Current Living Arrangements: home  People in Home: spouse  Able to Return to Prior Arrangements: yes    Housing Stability and Financial Resources (SDOH)  In the last 12 months, was there a time when you were not able to pay the mortgage or rent on time?: No  In the last 12 months, was there a time when you did not have a steady place to sleep or slept in a shelter (including now)?: No  How hard is it for you to pay for the very basics like food, housing, medical care, and heating?: Not hard at all    Assistive Device/Animal Currently Used at Home  none    Anticipated Changes Related to Illness  none    Transportation Concerns (SDOH)  Transportation Concerns: car, none  In the past 12 months, has lack of transportation kept you from medical appointments or from getting medications?: No  In the past 12 months, has lack of transportation kept you from meetings, work, or from getting things needed for daily living?: No      Anticipated Discharge  Plan   Anticipated Discharge Disposition: home with assistance, home without assistance or services  Met with patient. Provided education and contact information for Care Coordination services.: yes  Screening complete: yes    Chart reviewed, pt for possible lap champ.

## 2023-04-11 ENCOUNTER — ANESTHESIA EVENT (INPATIENT)
Dept: OPERATING ROOM | Facility: HOSPITAL | Age: 53
DRG: 418 | End: 2023-04-11
Payer: COMMERCIAL

## 2023-04-11 ENCOUNTER — ANESTHESIA (INPATIENT)
Dept: OPERATING ROOM | Facility: HOSPITAL | Age: 53
DRG: 418 | End: 2023-04-11
Payer: COMMERCIAL

## 2023-04-11 PROCEDURE — 88304 TISSUE EXAM BY PATHOLOGIST: CPT | Performed by: SURGERY

## 2023-04-11 PROCEDURE — 25000000 HC PHARMACY GENERAL: Performed by: NURSE ANESTHETIST, CERTIFIED REGISTERED

## 2023-04-11 PROCEDURE — A4648 IMPLANTABLE TISSUE MARKER: HCPCS | Performed by: SURGERY

## 2023-04-11 PROCEDURE — 0FT44ZZ RESECTION OF GALLBLADDER, PERCUTANEOUS ENDOSCOPIC APPROACH: ICD-10-PCS | Performed by: SURGERY

## 2023-04-11 PROCEDURE — 63700000 HC SELF-ADMINISTRABLE DRUG: Performed by: PHYSICIAN ASSISTANT

## 2023-04-11 PROCEDURE — 63600000 HC DRUGS/DETAIL CODE: Performed by: SURGERY

## 2023-04-11 PROCEDURE — 25800000 HC PHARMACY IV SOLUTIONS: Performed by: SURGERY

## 2023-04-11 PROCEDURE — 63700000 HC SELF-ADMINISTRABLE DRUG: Performed by: SURGERY

## 2023-04-11 PROCEDURE — 25000000 HC PHARMACY GENERAL: Performed by: SURGERY

## 2023-04-11 PROCEDURE — 36000004 HC OR LEVEL 4 INITIAL 30MIN: Performed by: SURGERY

## 2023-04-11 PROCEDURE — 25800000 HC PHARMACY IV SOLUTIONS: Performed by: NURSE ANESTHETIST, CERTIFIED REGISTERED

## 2023-04-11 PROCEDURE — 63600000 HC DRUGS/DETAIL CODE: Performed by: NURSE ANESTHETIST, CERTIFIED REGISTERED

## 2023-04-11 PROCEDURE — 12000000 HC ROOM AND CARE MED/SURG

## 2023-04-11 PROCEDURE — 63700000 HC SELF-ADMINISTRABLE DRUG: Performed by: ANESTHESIOLOGY

## 2023-04-11 PROCEDURE — 27200000 HC STERILE SUPPLY: Performed by: SURGERY

## 2023-04-11 PROCEDURE — 36000014 HC OR LEVEL 4 EA ADDL MIN: Performed by: SURGERY

## 2023-04-11 PROCEDURE — 71000001 HC PACU PHASE 1 INITIAL 30MIN: Performed by: SURGERY

## 2023-04-11 PROCEDURE — 37000001 HC ANESTHESIA GENERAL: Performed by: SURGERY

## 2023-04-11 RX ORDER — SODIUM CHLORIDE, SODIUM GLUCONATE, SODIUM ACETATE, POTASSIUM CHLORIDE AND MAGNESIUM CHLORIDE 30; 37; 368; 526; 502 MG/100ML; MG/100ML; MG/100ML; MG/100ML; MG/100ML
INJECTION, SOLUTION INTRAVENOUS CONTINUOUS PRN
Status: DISCONTINUED | OUTPATIENT
Start: 2023-04-11 | End: 2023-04-11 | Stop reason: SURG

## 2023-04-11 RX ORDER — KETOROLAC TROMETHAMINE 30 MG/ML
30 INJECTION, SOLUTION INTRAMUSCULAR; INTRAVENOUS EVERY 6 HOURS PRN
Status: DISCONTINUED | OUTPATIENT
Start: 2023-04-11 | End: 2023-04-13 | Stop reason: HOSPADM

## 2023-04-11 RX ORDER — IBUPROFEN 200 MG
16-32 TABLET ORAL AS NEEDED
Status: DISCONTINUED | OUTPATIENT
Start: 2023-04-11 | End: 2023-04-11

## 2023-04-11 RX ORDER — FENTANYL CITRATE 50 UG/ML
50 INJECTION, SOLUTION INTRAMUSCULAR; INTRAVENOUS EVERY 5 MIN PRN
Status: DISCONTINUED | OUTPATIENT
Start: 2023-04-11 | End: 2023-04-11

## 2023-04-11 RX ORDER — PROPOFOL 10 MG/ML
INJECTION, EMULSION INTRAVENOUS AS NEEDED
Status: DISCONTINUED | OUTPATIENT
Start: 2023-04-11 | End: 2023-04-11 | Stop reason: SURG

## 2023-04-11 RX ORDER — BUPIVACAINE HYDROCHLORIDE AND EPINEPHRINE 5; 5 MG/ML; UG/ML
INJECTION, SOLUTION EPIDURAL; INTRACAUDAL; PERINEURAL
Status: DISCONTINUED | OUTPATIENT
Start: 2023-04-11 | End: 2023-04-11 | Stop reason: HOSPADM

## 2023-04-11 RX ORDER — HYDROMORPHONE HYDROCHLORIDE 1 MG/ML
0.5 INJECTION, SOLUTION INTRAMUSCULAR; INTRAVENOUS; SUBCUTANEOUS
Status: DISCONTINUED | OUTPATIENT
Start: 2023-04-11 | End: 2023-04-11

## 2023-04-11 RX ORDER — OXYCODONE HYDROCHLORIDE 5 MG/1
5 TABLET ORAL EVERY 4 HOURS PRN
Status: DISCONTINUED | OUTPATIENT
Start: 2023-04-11 | End: 2023-04-13 | Stop reason: HOSPADM

## 2023-04-11 RX ORDER — LIDOCAINE HYDROCHLORIDE 10 MG/ML
INJECTION, SOLUTION INFILTRATION; PERINEURAL AS NEEDED
Status: DISCONTINUED | OUTPATIENT
Start: 2023-04-11 | End: 2023-04-11 | Stop reason: SURG

## 2023-04-11 RX ORDER — ROCURONIUM BROMIDE 10 MG/ML
INJECTION, SOLUTION INTRAVENOUS AS NEEDED
Status: DISCONTINUED | OUTPATIENT
Start: 2023-04-11 | End: 2023-04-11 | Stop reason: SURG

## 2023-04-11 RX ORDER — SCOPOLAMINE 1 MG/3D
1 PATCH, EXTENDED RELEASE TRANSDERMAL
Status: DISCONTINUED | OUTPATIENT
Start: 2023-04-11 | End: 2023-04-11 | Stop reason: HOSPADM

## 2023-04-11 RX ORDER — DEXTROSE 50 % IN WATER (D50W) INTRAVENOUS SYRINGE
25 AS NEEDED
Status: DISCONTINUED | OUTPATIENT
Start: 2023-04-11 | End: 2023-04-11

## 2023-04-11 RX ORDER — ONDANSETRON HYDROCHLORIDE 2 MG/ML
INJECTION, SOLUTION INTRAVENOUS AS NEEDED
Status: DISCONTINUED | OUTPATIENT
Start: 2023-04-11 | End: 2023-04-11 | Stop reason: SURG

## 2023-04-11 RX ORDER — FAMOTIDINE 10 MG/ML
INJECTION INTRAVENOUS AS NEEDED
Status: DISCONTINUED | OUTPATIENT
Start: 2023-04-11 | End: 2023-04-11 | Stop reason: SURG

## 2023-04-11 RX ORDER — DEXTROSE 40 %
15-30 GEL (GRAM) ORAL AS NEEDED
Status: DISCONTINUED | OUTPATIENT
Start: 2023-04-11 | End: 2023-04-11

## 2023-04-11 RX ORDER — DEXAMETHASONE SODIUM PHOSPHATE 4 MG/ML
INJECTION, SOLUTION INTRA-ARTICULAR; INTRALESIONAL; INTRAMUSCULAR; INTRAVENOUS; SOFT TISSUE AS NEEDED
Status: DISCONTINUED | OUTPATIENT
Start: 2023-04-11 | End: 2023-04-11 | Stop reason: SURG

## 2023-04-11 RX ORDER — FENTANYL CITRATE 50 UG/ML
INJECTION, SOLUTION INTRAMUSCULAR; INTRAVENOUS AS NEEDED
Status: DISCONTINUED | OUTPATIENT
Start: 2023-04-11 | End: 2023-04-11 | Stop reason: SURG

## 2023-04-11 RX ORDER — DEXTROSE MONOHYDRATE AND SODIUM CHLORIDE 5; .9 G/100ML; G/100ML
INJECTION, SOLUTION INTRAVENOUS CONTINUOUS
Status: DISCONTINUED | OUTPATIENT
Start: 2023-04-11 | End: 2023-04-12

## 2023-04-11 RX ORDER — HYDROMORPHONE HYDROCHLORIDE 1 MG/ML
INJECTION, SOLUTION INTRAMUSCULAR; INTRAVENOUS; SUBCUTANEOUS AS NEEDED
Status: DISCONTINUED | OUTPATIENT
Start: 2023-04-11 | End: 2023-04-11 | Stop reason: SURG

## 2023-04-11 RX ORDER — MIDAZOLAM HYDROCHLORIDE 2 MG/2ML
INJECTION, SOLUTION INTRAMUSCULAR; INTRAVENOUS AS NEEDED
Status: DISCONTINUED | OUTPATIENT
Start: 2023-04-11 | End: 2023-04-11 | Stop reason: SURG

## 2023-04-11 RX ADMIN — METRONIDAZOLE 500 MG: 500 INJECTION, SOLUTION INTRAVENOUS at 00:53

## 2023-04-11 RX ADMIN — FAMOTIDINE 20 MG: 10 INJECTION INTRAVENOUS at 13:23

## 2023-04-11 RX ADMIN — ONDANSETRON 4 MG: 2 INJECTION INTRAMUSCULAR; INTRAVENOUS at 14:27

## 2023-04-11 RX ADMIN — SCOPALAMINE 1 PATCH: 1 PATCH, EXTENDED RELEASE TRANSDERMAL at 12:51

## 2023-04-11 RX ADMIN — HYDROMORPHONE HYDROCHLORIDE 0.5 MG: 1 INJECTION, SOLUTION INTRAMUSCULAR; INTRAVENOUS; SUBCUTANEOUS at 14:16

## 2023-04-11 RX ADMIN — DOCUSATE SODIUM 100 MG: 100 CAPSULE, LIQUID FILLED ORAL at 00:53

## 2023-04-11 RX ADMIN — CEFTRIAXONE SODIUM 1 G: 1 INJECTION, POWDER, FOR SOLUTION INTRAMUSCULAR; INTRAVENOUS at 21:50

## 2023-04-11 RX ADMIN — ROCURONIUM BROMIDE 50 MG: 10 SOLUTION INTRAVENOUS at 13:15

## 2023-04-11 RX ADMIN — ACETAMINOPHEN 650 MG: 325 TABLET ORAL at 09:59

## 2023-04-11 RX ADMIN — PROPOFOL 200 MG: 10 INJECTION, EMULSION INTRAVENOUS at 13:15

## 2023-04-11 RX ADMIN — SODIUM CHLORIDE, SODIUM GLUCONATE, SODIUM ACETATE, POTASSIUM CHLORIDE AND MAGNESIUM CHLORIDE: 526; 502; 368; 37; 30 INJECTION, SOLUTION INTRAVENOUS at 13:10

## 2023-04-11 RX ADMIN — FENTANYL CITRATE 50 MCG: 50 INJECTION, SOLUTION INTRAMUSCULAR; INTRAVENOUS at 13:15

## 2023-04-11 RX ADMIN — KETOROLAC TROMETHAMINE 30 MG: 30 INJECTION, SOLUTION INTRAMUSCULAR at 20:27

## 2023-04-11 RX ADMIN — HYDROMORPHONE HYDROCHLORIDE 0.25 MG: 1 INJECTION, SOLUTION INTRAMUSCULAR; INTRAVENOUS; SUBCUTANEOUS at 18:13

## 2023-04-11 RX ADMIN — PROPOFOL 15 MCG/KG/MIN: 10 INJECTION, EMULSION INTRAVENOUS at 13:16

## 2023-04-11 RX ADMIN — MIDAZOLAM 2 MG: 1 INJECTION INTRAMUSCULAR; INTRAVENOUS at 13:08

## 2023-04-11 RX ADMIN — METRONIDAZOLE 500 MG: 500 INJECTION, SOLUTION INTRAVENOUS at 17:56

## 2023-04-11 RX ADMIN — SODIUM CHLORIDE, SODIUM GLUCONATE, SODIUM ACETATE, POTASSIUM CHLORIDE AND MAGNESIUM CHLORIDE: 526; 502; 368; 37; 30 INJECTION, SOLUTION INTRAVENOUS at 14:32

## 2023-04-11 RX ADMIN — METRONIDAZOLE 500 MG: 500 INJECTION, SOLUTION INTRAVENOUS at 10:31

## 2023-04-11 RX ADMIN — DEXTROSE AND SODIUM CHLORIDE: 5; 900 INJECTION, SOLUTION INTRAVENOUS at 16:15

## 2023-04-11 RX ADMIN — LIDOCAINE HYDROCHLORIDE 5 ML: 10 INJECTION, SOLUTION INFILTRATION; PERINEURAL at 13:15

## 2023-04-11 RX ADMIN — FENTANYL CITRATE 50 MCG: 50 INJECTION, SOLUTION INTRAMUSCULAR; INTRAVENOUS at 13:39

## 2023-04-11 RX ADMIN — SUGAMMADEX 200 MG: 100 INJECTION, SOLUTION INTRAVENOUS at 14:48

## 2023-04-11 RX ADMIN — HYDROMORPHONE HYDROCHLORIDE 0.5 MG: 1 INJECTION, SOLUTION INTRAMUSCULAR; INTRAVENOUS; SUBCUTANEOUS at 14:27

## 2023-04-11 RX ADMIN — DEXAMETHASONE SODIUM PHOSPHATE 4 MG: 4 INJECTION, SOLUTION INTRA-ARTICULAR; INTRALESIONAL; INTRAMUSCULAR; INTRAVENOUS; SOFT TISSUE at 13:23

## 2023-04-11 ASSESSMENT — COGNITIVE AND FUNCTIONAL STATUS - GENERAL
CLIMB 3 TO 5 STEPS WITH RAILING: 3 - A LITTLE
STANDING UP FROM CHAIR USING ARMS: 2 - A LOT
WALKING IN HOSPITAL ROOM: 3 - A LITTLE
MOVING TO AND FROM BED TO CHAIR: 2 - A LOT

## 2023-04-11 NOTE — ANESTHESIOLOGIST PRE-PROCEDURE ATTESTATION
Pre-Procedure Patient Identification:  I am the Primary Anesthesiologist and have identified the patient on 04/11/23 at 12:51 PM.   I have confirmed the procedure(s) will be performed by the following surgeon/proceduralist Ashlie Rangel MD.

## 2023-04-11 NOTE — ANESTHESIA PROCEDURE NOTES
Airway  Urgency: elective    Start Time: 4/11/2023 1:16 PM  Airway not difficult    General Information and Staff    Patient location during procedure: OR  Anesthesiologist: Devon Daily MD  Resident/CRNA: Colette Ordonez CRNA  Performed: resident/CRNA   Performed by: Colette Ordonez CRNA  Authorized by: Devon Daily MD      Indications and Patient Condition  Indications for airway management: anesthesia  Sedation level: deep  Preoxygenated: yes  Patient position: sniffing  MILS not maintained throughout  Mask difficulty assessment: 1 - vent by mask    Final Airway Details  Final airway type: endotracheal airway      Successful airway: ETT  Cuffed: yes   Successful intubation technique: video laryngoscopy  Facilitating devices/methods: intubating stylet  Endotracheal tube insertion site: oral  Blade: Clem  Blade size: #3  ETT size (mm): 7.0  Cormack-Lehane Classification: grade I - full view of glottis  Placement verified by: chest auscultation and capnometry   Measured from: lips  ETT to lips (cm): 22  Number of attempts at approach: 1  Number of other approaches attempted: 0  Atraumatic airway insertion      Additional Comments  Glidescope used because open and clean/unused from previous patient. Otherwise normal airway.

## 2023-04-11 NOTE — PROGRESS NOTES
General Surgery Daily Progress Note    Subjective     Still having pain. Tolerated clears yesterday.       Objective     Vital signs in last 24 hours:  Temp:  [36.8 °C (98.2 °F)-37.2 °C (99 °F)] 36.8 °C (98.2 °F)  Heart Rate:  [67-86] 86  Resp:  [18] 18  BP: (135-150)/(72-88) 135/86      Intake/Output Summary (Last 24 hours) at 4/11/2023 1252  Last data filed at 4/10/2023 1700  Gross per 24 hour   Intake 1650 ml   Output --   Net 1650 ml     Intake/Output this shift:  No intake/output data recorded.    Physical Exam    General appearance: Awake, alert, in no acute distress or obvious discomfort  Neurologic: Alert, cooperative. Coherent speech.  Lungs: Respirations nonlabored, no tachypnea.   Abdomen: Soft, nondistended.   Tubes/Lines/Drains: pIV  Extremities: No cyanosis, edema.       Labs  CBC Results       04/10/23 04/09/23     0344 1809    WBC 10.02 11.25    RBC 4.47 4.68    HGB 13.1 13.7    HCT 40.1 41.6    MCV 89.7 88.9    MCH 29.3 29.3    MCHC 32.7 32.9     257        BMP Results       04/10/23 04/09/23     0344 1809     133    K 3.3 3.7    Cl 106 98    CO2 24 28    Glucose 120 130    BUN 7 11    Creatinine 0.6 0.7    Calcium 8.4 9.3    Anion Gap 6 7    EGFR >60.0 >60.0         Comment for K at 1809 on 04/09/23: Results obtained on plasma. Plasma Potassium values may be up to 0.4 mEQ/L less than serum values. The differences may be greater for patients with high platelet or white cell counts.            Imaging  No new      Assessment/Plan   Acute cholecystitis.  -- proceed with antonio oakes.        Ashlie Rangel MD

## 2023-04-11 NOTE — ANESTHESIA PREPROCEDURE EVALUATION
Relevant Problems   No relevant active problems       Anesthesia ROS/MED HX    Anesthesia History - neg  Pulmonary - neg  Neuro/Psych - neg  Cardiovascular- neg  Hematological - neg  GI/Hepatic- neg  Musculoskeletal- neg  Renal Disease- neg  Endo/Other  History of cancer and breast cancer       Allergies:   Allergies   Allergen Reactions   • Sulfa (Sulfonamide Antibiotics) Hives       ROS: Denies any chest pain or shortness of breath    PMH:   Past Medical History:   Diagnosis Date   • Breast cancer (CMS/HCC)        PSH:   Past Surgical History:   Procedure Laterality Date   •  SECTION     • MASTECTOMY Bilateral        No current facility-administered medications on file prior to encounter.     No current outpatient medications on file prior to encounter.       CBC Results       04/10/23 04/09/23     0344 1809    WBC 10.02 11.25    RBC 4.47 4.68    HGB 13.1 13.7    HCT 40.1 41.6    MCV 89.7 88.9    MCH 29.3 29.3    MCHC 32.7 32.9     257        BMP Results       04/10/23 04/09/23     0344 1809     133    K 3.3 3.7    Cl 106 98    CO2 24 28    Glucose 120 130    BUN 7 11    Creatinine 0.6 0.7    Calcium 8.4 9.3    Anion Gap 6 7    EGFR >60.0 >60.0         Comment for K at 1809 on 23: Results obtained on plasma. Plasma Potassium values may be up to 0.4 mEQ/L less than serum values. The differences may be greater for patients with high platelet or white cell counts.                  Physical Exam    Airway   Mallampati: II   TM distance: >3 FB   Neck ROM: full  Cardiovascular - normal   Rhythm: regular   Rate: normalPulmonary - normal   clear to auscultation  Dental - normal        Anesthesia Plan    Plan: general    Technique: general endotracheal     Airway: direct visual laryngoscopy     instructed to abstain from smoking on day of procedure    Patient did not smoke on day of surgery     Pregnancy status reviewed  2 ASA  Anesthetic plan and risks discussed with: patient  Induction:     intravenous   Postop Plan:   Patient Disposition: inpatient floor planned admission  Comments:    Plan: GETA

## 2023-04-11 NOTE — OP NOTE
Procedure: CHOLECYSTECTOMY LAPAROSCOPIC      Date of Surgery: 4/11/2023    Indications:   The patient is a 52 year old woman who presented to the ED with complaint of epigastric and right upper quadrant pain associated with nausea and vomiting and findings of acute calculous cholecystitis on CT scan and ultrasound, including a large stone impacted in gallbladder neck. She is brought to the operating room at this time for planned laparoscopic cholecystectomy.     Pre-Op Diagnosis:  Pre-op Diagnosis     * Acute calculous cholecystitis [K80.00]    Post-Op Diagnosis:  Post-op Diagnosis     * Acute calculous cholecystitis [K80.00]    Surgeon: Ashlie Rangel MD    Assistant: VIOLETA Smith    Anesthesia: General endotracheal anesthesia and local marcaine    Estimated Blood Loss:  10 mL.    Urine Output: No Durand            Specimens:   ID Type Source Tests Collected by Time Destination   1 : gallbladder Tissue Gallbladder PATHOLOGY TISSUE EXAM Ashlie Rangel MD 4/11/2023 1340        Findings: + small ascites in right upper quadrant. Gallbladder distended, mildly ischemic, very edematous. Stone palpable in gallbladder neck. Short, wide gallbladder neck.            Drains/Packing: None    Implants: * No implants in log *    Complications: None immediate.           Condition: stable    Disposition: PACU - hemodynamically stable.    Procedure In Detail  At the time of the procedure, the patient was brought to the operating room and correctly identified by me, the anesthesia staff, and operating room team.  She was placed on operating table in supine position.  She was given general anesthesia and intubated without incident.  Her abdomen was cleared of hair using electric clippers.  It was then prepped and draped in usual sterile fashion.  The infraumbilical fold was infiltrated with local anesthetic.  It was grasped with 2 penetrating towel clips and elevated.  An incision was made using an 11 blade scalpel.  S  retractors x 2 were used to dissect down through the subcutaneous tissue to the level of the fascia. The fascia was grasped, elevated, and sharply entered, as was the underlying peritoneum. 0 Vicryl stay sutures were placed through the edges of the fascia and used to secure a Kumar trocar in position.  Pneumoperitoneum was then established to a pressure of 15 mmHg maintained throughout the case. A 30° 5 mm laparoscope was advanced into the peritoneal cavity under laparoscopic visualization.  Laparoscopic inspection showed no injury from initial trocar placement.  The patient was placed in reverse Trendelenburg position with right side up.  Immediate findings included small ascites in right upper quadrant and a distended gallbladder. Secondary trochars were placed under laparoscopic visualization after anesthetizing the abdominal wall.  5 mm trocars were placed in the midline epigastrium, the right midclavicular line, and the right anterior axillary line. The right upper quadrant was explored.  The gallbladder was identified and noted to be edematous, distended and mildly ischemic.   The gallbladder fundus was grasped and elevated over the dome of the liver.  The gallbladder infundibulum was grasped and retracted towards the patient's right lower quadrant, although it was somewhat difficult to manipulate due to a large stone within the gallbladder neck. The peritoneum overlying the cystic duct and artery was opened and the cystic duct and medial and lateral branches of the cystic artery were individually identified, circumferentially dissected, clipped proximally and distally and sharply divided. The cystic duct was noted to be rather short and wide. The remaining gallbladder attachments to the liver were divided using electrocautery.  The gallbladder was placed within a specimen retrieval bag and extracted out through the umbilical trocar site.  The fascial incision required sharp extension in order to accommodate  specimen removal.  The right upper quadrant was copiously irrigated with normal saline solution until returning fluid was clear.  The gallbladder fossa was inspected.  Electrocautery was applied until adequate hemostasis was achieved.  The patient was returned to supine position.  Secondary trochars were removed under laparoscopic visualization and sites were noted to be hemostatic.  The last remaining trocar at the umbilicus was used to vent as much pneumoperitoneum as possible and was then removed.  0 Vicryl sutures was used to close the umbilical trocar site of the level of the fascia in a figure-of-eight fashion x2.  Deep dermis was approximated interrupted 3-0 Vicryl suture.  The skin edges were closed with 4-0 Monocryl subcuticular stitch.  Mastisol, Steri-Strips, and sterile dressings were applied.  The patient was awakened from anesthesia and extubated.  She was transferred to the recovery room in stable condition.  All needle, sponge, and instrument counts were correct x2 at the end of the procedure.           Ashlie Rangel MD  4/11/2023  3:20 PM

## 2023-04-11 NOTE — OR SURGEON
Pre-Procedure patient identification:  I am the primary operating surgeon/proceduralist and I have identified the patient on 04/11/23 at 12:51 PM Ashlie Rangel MD  Phone Number: [phone]

## 2023-04-12 LAB
ALBUMIN SERPL-MCNC: 3.9 G/DL (ref 3.4–5)
ALP SERPL-CCNC: 72 IU/L (ref 35–126)
ALT SERPL-CCNC: 75 IU/L (ref 11–54)
ANION GAP SERPL CALC-SCNC: 7 MEQ/L (ref 3–15)
AST SERPL-CCNC: 83 IU/L (ref 15–41)
BILIRUB SERPL-MCNC: 0.2 MG/DL (ref 0.3–1.2)
BUN SERPL-MCNC: 7 MG/DL (ref 8–20)
CALCIUM SERPL-MCNC: 9.2 MG/DL (ref 8.9–10.3)
CASE RPRT: NORMAL
CHLORIDE SERPL-SCNC: 103 MEQ/L (ref 98–109)
CLINICAL INFO: NORMAL
CO2 SERPL-SCNC: 26 MEQ/L (ref 22–32)
CREAT SERPL-MCNC: 0.6 MG/DL (ref 0.6–1.1)
ERYTHROCYTE [DISTWIDTH] IN BLOOD BY AUTOMATED COUNT: 12.9 % (ref 11.7–14.4)
GFR SERPL CREATININE-BSD FRML MDRD: >60 ML/MIN/1.73M*2
GLUCOSE SERPL-MCNC: 104 MG/DL (ref 70–99)
HCT VFR BLDCO AUTO: 42.3 % (ref 35–45)
HGB BLD-MCNC: 13.5 G/DL (ref 11.8–15.7)
MCH RBC QN AUTO: 29.2 PG (ref 28–33.2)
MCHC RBC AUTO-ENTMCNC: 31.9 G/DL (ref 32.2–35.5)
MCV RBC AUTO: 91.6 FL (ref 83–98)
PATH REPORT.FINAL DX SPEC: NORMAL
PATH REPORT.GROSS SPEC: NORMAL
PDW BLD AUTO: 9.9 FL (ref 9.4–12.3)
PHOSPHATE SERPL-MCNC: 2.8 MG/DL (ref 2.4–4.7)
PLATELET # BLD AUTO: 255 K/UL (ref 150–369)
POTASSIUM SERPL-SCNC: 3.8 MEQ/L (ref 3.6–5.1)
PROT SERPL-MCNC: 7.4 G/DL (ref 6–8.2)
RBC # BLD AUTO: 4.62 M/UL (ref 3.93–5.22)
SODIUM SERPL-SCNC: 136 MEQ/L (ref 136–144)
WBC # BLD AUTO: 13.04 K/UL (ref 3.8–10.5)

## 2023-04-12 PROCEDURE — 63600000 HC DRUGS/DETAIL CODE

## 2023-04-12 PROCEDURE — 12000000 HC ROOM AND CARE MED/SURG

## 2023-04-12 PROCEDURE — 85027 COMPLETE CBC AUTOMATED: CPT | Performed by: SURGERY

## 2023-04-12 PROCEDURE — 63700000 HC SELF-ADMINISTRABLE DRUG: Performed by: SURGERY

## 2023-04-12 PROCEDURE — 25000000 HC PHARMACY GENERAL: Performed by: SURGERY

## 2023-04-12 PROCEDURE — 84100 ASSAY OF PHOSPHORUS: CPT | Performed by: SURGERY

## 2023-04-12 PROCEDURE — 25800000 HC PHARMACY IV SOLUTIONS: Performed by: SURGERY

## 2023-04-12 PROCEDURE — 36415 COLL VENOUS BLD VENIPUNCTURE: CPT | Performed by: SURGERY

## 2023-04-12 PROCEDURE — 80053 COMPREHEN METABOLIC PANEL: CPT | Performed by: SURGERY

## 2023-04-12 PROCEDURE — 63600000 HC DRUGS/DETAIL CODE: Performed by: SURGERY

## 2023-04-12 RX ORDER — SIMETHICONE 80 MG
80 TABLET,CHEWABLE ORAL 4 TIMES DAILY PRN
Status: DISCONTINUED | OUTPATIENT
Start: 2023-04-12 | End: 2023-04-13 | Stop reason: HOSPADM

## 2023-04-12 RX ORDER — PROCHLORPERAZINE EDISYLATE 5 MG/ML
5 INJECTION INTRAMUSCULAR; INTRAVENOUS EVERY 6 HOURS PRN
Status: DISCONTINUED | OUTPATIENT
Start: 2023-04-12 | End: 2023-04-13 | Stop reason: HOSPADM

## 2023-04-12 RX ORDER — DOCUSATE SODIUM 100 MG/1
100 CAPSULE, LIQUID FILLED ORAL 2 TIMES DAILY
Status: DISCONTINUED | OUTPATIENT
Start: 2023-04-12 | End: 2023-04-13 | Stop reason: HOSPADM

## 2023-04-12 RX ADMIN — CEFTRIAXONE SODIUM 1 G: 1 INJECTION, POWDER, FOR SOLUTION INTRAMUSCULAR; INTRAVENOUS at 22:54

## 2023-04-12 RX ADMIN — HYDROMORPHONE HYDROCHLORIDE 0.25 MG: 1 INJECTION, SOLUTION INTRAMUSCULAR; INTRAVENOUS; SUBCUTANEOUS at 15:55

## 2023-04-12 RX ADMIN — HYDROMORPHONE HYDROCHLORIDE 0.25 MG: 1 INJECTION, SOLUTION INTRAMUSCULAR; INTRAVENOUS; SUBCUTANEOUS at 11:38

## 2023-04-12 RX ADMIN — OXYCODONE HYDROCHLORIDE 5 MG: 5 TABLET ORAL at 16:47

## 2023-04-12 RX ADMIN — METRONIDAZOLE 500 MG: 500 INJECTION, SOLUTION INTRAVENOUS at 08:18

## 2023-04-12 RX ADMIN — DOCUSATE SODIUM 100 MG: 100 CAPSULE, LIQUID FILLED ORAL at 08:17

## 2023-04-12 RX ADMIN — HYDROMORPHONE HYDROCHLORIDE 0.25 MG: 1 INJECTION, SOLUTION INTRAMUSCULAR; INTRAVENOUS; SUBCUTANEOUS at 08:34

## 2023-04-12 RX ADMIN — KETOROLAC TROMETHAMINE 30 MG: 30 INJECTION, SOLUTION INTRAMUSCULAR at 12:53

## 2023-04-12 RX ADMIN — DOCUSATE SODIUM 100 MG: 100 CAPSULE, LIQUID FILLED ORAL at 20:49

## 2023-04-12 RX ADMIN — HYDROMORPHONE HYDROCHLORIDE 0.25 MG: 1 INJECTION, SOLUTION INTRAMUSCULAR; INTRAVENOUS; SUBCUTANEOUS at 05:26

## 2023-04-12 RX ADMIN — OXYCODONE HYDROCHLORIDE 5 MG: 5 TABLET ORAL at 20:49

## 2023-04-12 RX ADMIN — ONDANSETRON HYDROCHLORIDE 4 MG: 2 SOLUTION INTRAMUSCULAR; INTRAVENOUS at 00:50

## 2023-04-12 RX ADMIN — PROCHLORPERAZINE EDISYLATE 5 MG: 5 INJECTION INTRAMUSCULAR; INTRAVENOUS at 03:18

## 2023-04-12 RX ADMIN — KETOROLAC TROMETHAMINE 30 MG: 30 INJECTION, SOLUTION INTRAMUSCULAR at 03:15

## 2023-04-12 RX ADMIN — SIMETHICONE 80 MG: 80 TABLET, CHEWABLE ORAL at 23:02

## 2023-04-12 RX ADMIN — HYDROMORPHONE HYDROCHLORIDE 0.25 MG: 1 INJECTION, SOLUTION INTRAMUSCULAR; INTRAVENOUS; SUBCUTANEOUS at 19:24

## 2023-04-12 RX ADMIN — KETOROLAC TROMETHAMINE 30 MG: 30 INJECTION, SOLUTION INTRAMUSCULAR at 22:54

## 2023-04-12 RX ADMIN — HYDROMORPHONE HYDROCHLORIDE 0.25 MG: 1 INJECTION, SOLUTION INTRAMUSCULAR; INTRAVENOUS; SUBCUTANEOUS at 00:50

## 2023-04-12 RX ADMIN — SIMETHICONE 80 MG: 80 TABLET, CHEWABLE ORAL at 17:01

## 2023-04-12 RX ADMIN — METRONIDAZOLE 500 MG: 500 INJECTION, SOLUTION INTRAVENOUS at 00:49

## 2023-04-12 RX ADMIN — METRONIDAZOLE 500 MG: 500 INJECTION, SOLUTION INTRAVENOUS at 16:19

## 2023-04-12 NOTE — PLAN OF CARE
Plan of Care Review  Plan of Care Reviewed With: patient  Progress: improving  Outcome Summary: Continues on IV ABT. IV toradol and iv dilaudid administered for pain.  Pt. c/o nausea, iv zofran given with no effect, made provider aware, new order to d/c zofran and added PRN compazine q6. Ambulated X2 in hooper with supervision. VSS, Fall precaution in place, call bell in reach.

## 2023-04-12 NOTE — PLAN OF CARE
Pt pod#1 s/p lap champ. Per CPR and AM update pt is doing well; ambulating halls frequently. Poss. D/C to home today if pain controlled and she continues to tolerate her diet.  No skilled needs identified at d/c.    Anastacia Daugherty RN, CCM

## 2023-04-12 NOTE — PLAN OF CARE
Pt C/O pain on Rt neck, back and discomfort on abdomen, incision site dressing is clean and dry, but one spot has small spot of blood but not fresh, frequently pain medication given and heat pad applied to back, no any C/O nausea and vomite, ambulating frequently, Pt is in regular low fat diet.

## 2023-04-13 VITALS
TEMPERATURE: 97.9 F | RESPIRATION RATE: 18 BRPM | BODY MASS INDEX: 26.12 KG/M2 | OXYGEN SATURATION: 99 % | SYSTOLIC BLOOD PRESSURE: 145 MMHG | HEART RATE: 77 BPM | WEIGHT: 153 LBS | HEIGHT: 64 IN | DIASTOLIC BLOOD PRESSURE: 84 MMHG

## 2023-04-13 PROBLEM — K80.00 ACUTE CALCULOUS CHOLECYSTITIS: Status: RESOLVED | Noted: 2023-04-09 | Resolved: 2023-04-13

## 2023-04-13 LAB
ALBUMIN SERPL-MCNC: 2.9 G/DL (ref 3.4–5)
ALP SERPL-CCNC: 60 IU/L (ref 35–126)
ALT SERPL-CCNC: 45 IU/L (ref 11–54)
ANION GAP SERPL CALC-SCNC: 7 MEQ/L (ref 3–15)
AST SERPL-CCNC: 34 IU/L (ref 15–41)
BASOPHILS # BLD: 0.01 K/UL (ref 0.01–0.1)
BASOPHILS NFR BLD: 0.1 %
BILIRUB SERPL-MCNC: 0.5 MG/DL (ref 0.3–1.2)
BUN SERPL-MCNC: 7 MG/DL (ref 8–20)
CALCIUM SERPL-MCNC: 8.4 MG/DL (ref 8.9–10.3)
CHLORIDE SERPL-SCNC: 100 MEQ/L (ref 98–109)
CO2 SERPL-SCNC: 28 MEQ/L (ref 22–32)
CREAT SERPL-MCNC: 0.6 MG/DL (ref 0.6–1.1)
DIFFERENTIAL METHOD BLD: ABNORMAL
EOSINOPHIL # BLD: 0.02 K/UL (ref 0.04–0.36)
EOSINOPHIL NFR BLD: 0.2 %
ERYTHROCYTE [DISTWIDTH] IN BLOOD BY AUTOMATED COUNT: 12.9 % (ref 11.7–14.4)
GFR SERPL CREATININE-BSD FRML MDRD: >60 ML/MIN/1.73M*2
GLUCOSE SERPL-MCNC: 103 MG/DL (ref 70–99)
HCT VFR BLDCO AUTO: 33.7 % (ref 35–45)
HGB BLD-MCNC: 11.1 G/DL (ref 11.8–15.7)
IMM GRANULOCYTES # BLD AUTO: 0.04 K/UL (ref 0–0.08)
IMM GRANULOCYTES NFR BLD AUTO: 0.5 %
LYMPHOCYTES # BLD: 1.18 K/UL (ref 1.2–3.5)
LYMPHOCYTES NFR BLD: 13.7 %
MCH RBC QN AUTO: 29.6 PG (ref 28–33.2)
MCHC RBC AUTO-ENTMCNC: 32.9 G/DL (ref 32.2–35.5)
MCV RBC AUTO: 89.9 FL (ref 83–98)
MONOCYTES # BLD: 0.74 K/UL (ref 0.28–0.8)
MONOCYTES NFR BLD: 8.6 %
NEUTROPHILS # BLD: 6.62 K/UL (ref 1.7–7)
NEUTS SEG NFR BLD: 76.9 %
NRBC BLD-RTO: 0 %
PDW BLD AUTO: 9.7 FL (ref 9.4–12.3)
PLATELET # BLD AUTO: 181 K/UL (ref 150–369)
POTASSIUM SERPL-SCNC: 3.3 MEQ/L (ref 3.6–5.1)
PROT SERPL-MCNC: 5.7 G/DL (ref 6–8.2)
RBC # BLD AUTO: 3.75 M/UL (ref 3.93–5.22)
SODIUM SERPL-SCNC: 135 MEQ/L (ref 136–144)
WBC # BLD AUTO: 8.61 K/UL (ref 3.8–10.5)

## 2023-04-13 PROCEDURE — 36415 COLL VENOUS BLD VENIPUNCTURE: CPT | Performed by: SURGERY

## 2023-04-13 PROCEDURE — 85025 COMPLETE CBC W/AUTO DIFF WBC: CPT | Performed by: SURGERY

## 2023-04-13 PROCEDURE — 63600000 HC DRUGS/DETAIL CODE: Performed by: SURGERY

## 2023-04-13 PROCEDURE — 63700000 HC SELF-ADMINISTRABLE DRUG: Performed by: SURGERY

## 2023-04-13 PROCEDURE — 63600000 HC DRUGS/DETAIL CODE

## 2023-04-13 PROCEDURE — 25000000 HC PHARMACY GENERAL: Performed by: SURGERY

## 2023-04-13 PROCEDURE — 80053 COMPREHEN METABOLIC PANEL: CPT | Performed by: SURGERY

## 2023-04-13 RX ORDER — OXYCODONE AND ACETAMINOPHEN 5; 325 MG/1; MG/1
1 TABLET ORAL EVERY 4 HOURS PRN
Qty: 16 TABLET | Refills: 0 | Status: SHIPPED | OUTPATIENT
Start: 2023-04-13 | End: 2023-04-18

## 2023-04-13 RX ORDER — POTASSIUM CHLORIDE 750 MG/1
40 TABLET, EXTENDED RELEASE ORAL ONCE
Status: COMPLETED | OUTPATIENT
Start: 2023-04-13 | End: 2023-04-13

## 2023-04-13 RX ORDER — ONDANSETRON 4 MG/1
4 TABLET, FILM COATED ORAL EVERY 8 HOURS PRN
Qty: 12 TABLET | Refills: 0 | Status: SHIPPED | OUTPATIENT
Start: 2023-04-13 | End: 2023-04-20

## 2023-04-13 RX ADMIN — OXYCODONE HYDROCHLORIDE 5 MG: 5 TABLET ORAL at 04:00

## 2023-04-13 RX ADMIN — DOCUSATE SODIUM 100 MG: 100 CAPSULE, LIQUID FILLED ORAL at 08:27

## 2023-04-13 RX ADMIN — HYDROMORPHONE HYDROCHLORIDE 0.25 MG: 1 INJECTION, SOLUTION INTRAMUSCULAR; INTRAVENOUS; SUBCUTANEOUS at 08:28

## 2023-04-13 RX ADMIN — METRONIDAZOLE 500 MG: 500 INJECTION, SOLUTION INTRAVENOUS at 02:19

## 2023-04-13 RX ADMIN — PROCHLORPERAZINE EDISYLATE 5 MG: 5 INJECTION INTRAMUSCULAR; INTRAVENOUS at 10:00

## 2023-04-13 RX ADMIN — SIMETHICONE 80 MG: 80 TABLET, CHEWABLE ORAL at 08:27

## 2023-04-13 RX ADMIN — METRONIDAZOLE 500 MG: 500 INJECTION, SOLUTION INTRAVENOUS at 08:33

## 2023-04-13 RX ADMIN — KETOROLAC TROMETHAMINE 30 MG: 30 INJECTION, SOLUTION INTRAMUSCULAR at 12:54

## 2023-04-13 RX ADMIN — POTASSIUM CHLORIDE 40 MEQ: 750 TABLET, EXTENDED RELEASE ORAL at 12:54

## 2023-04-13 ASSESSMENT — COGNITIVE AND FUNCTIONAL STATUS - GENERAL
STANDING UP FROM CHAIR USING ARMS: 2 - A LOT
MOVING TO AND FROM BED TO CHAIR: 2 - A LOT
CLIMB 3 TO 5 STEPS WITH RAILING: 2 - A LOT
WALKING IN HOSPITAL ROOM: 2 - A LOT

## 2023-04-13 NOTE — PLAN OF CARE
Problem: Goal Outcome Summary  Goal: Goal Outcome Summary  Outcome: No Change  Patient A&O, lungs CTA, bowel sounds active-passing gas. Pt taking PRN oxycodone for pain. No numbness, tingling, weakness or SOB per pt report. Fluids promoted, IV saline locked toward end of shift. Dressing CDI. Pt uses call light appropriately and is able to make needs known.  Will continue to monitor.     Regarding previous progress note, no other incidents of respiratory depression noted for remainder of shift.       Pt pod#2 s/p lap champ. Per CPR and AM update, pt still requiring Dilaudid 0.25mg IV for breakthrough pain, Compazine for nausea.   Pt on regular diet. No skilled needs at d/c.    Anastacia Daugherty RN, CCM

## 2023-04-13 NOTE — DISCHARGE INSTRUCTIONS
Laparoscopic Cholecystectomy, Care After    This sheet gives you information about how to care for yourself after your procedure. Your health care provider may also give you more specific instructions. If you have problems or questions, contact your health care provider.    What can I expect after the procedure?  After the procedure, it is common to have:  Pain at your incision sites. You will be given medicines to control this pain.  Mild nausea or vomiting.  Bloating and possible shoulder pain from the air-like gas that was used during the procedure.    Follow these instructions at home:  Incision care    Follow instructions from your health care provider about how to take care of your incisions. Make sure you:  Wash your hands with soap and water before you change your bandage (dressing). If soap and water are not available, use hand .  Remove gauze and clear plastic dressings (Tegaderm) 2 days after surgery.   Leave adhesive strips in place. These skin closures should remain in place for at least a week. They will fall off on their own, or may be removed after 10 days.  Do not take baths, swim, or use a hot tub until your health care provider approves. You may shower, starting 2 days after surgery, after removing gauze dressings.   Check your incision area every day for signs of infection. Check for:  More redness, swelling, or pain.  More fluid or blood.  Warmth.  Pus or a bad smell.    Activity  Do not drive or use heavy machinery while taking prescription pain medicine.  Do not lift anything that is heavier than 10 lb (4.5 kg) until your health care provider approves.  Do not play contact sports until your health care provider approves.  Do not drive for 24 hours if you were given a medicine to help you relax (sedative).  Rest as needed. Do not return to work or school until your health care provider approves.    General instructions  Take over-the-counter and prescription medicines only as told by  your health care provider.  To prevent or treat constipation while you are taking prescription pain medicine, your health care provider may recommend that you:  Drink enough fluid to keep your urine clear or pale yellow.  Take over-the-counter or prescription medicines.  Eat foods that are high in fiber, such as fresh fruits and vegetables, whole grains, and beans.  Limit foods that are high in fat and processed sugars, such as fried and sweet foods.    Contact a health care provider if:  You develop a rash.  You have more redness, swelling, or pain around your incisions.  You have more fluid or blood coming from your incisions.  Your incisions feel warm to the touch.  You have pus or a bad smell coming from your incisions.  You have a fever.  One or more of your incisions breaks open.    Get help right away if:  You have trouble breathing.  You have chest pain.  You have increasing pain in your shoulders.  You faint or feel dizzy when you stand.  You have severe pain in your abdomen.  You have nausea or vomiting that lasts for more than one day.  You have leg pain.    Follow-Up:  Call the office of your surgeon to schedule a follow-up appointment for about 2 weeks from your surgery:  Ashlie Rangel MD, [facility]  General Surgery  [phone]  [address] [facility] [place] [place] [address]  [place] [address]    This information is not intended to replace advice given to you by your health care provider. Make sure you discuss any questions you have with your health care provider.  Document Released: 12/18/2006 Document Revised: 11/30/2018 Document Reviewed: 06/05/2017  Elsevier Patient Education © 2020 [employer]    Low-Fat Diet for Pancreatitis or Gallbladder Conditions    A low-fat diet can be helpful if you have pancreatitis or a gallbladder condition. With these conditions, your pancreas and gallbladder have trouble digesting fats. A healthy eating plan with less fat will help rest your pancreas and gallbladder and  reduce your symptoms.      What do I need to know about this diet?  Eat a low-fat diet.  Reduce your fat intake to less than 20-30% of your total daily calories. This is less than 50-60 g of fat per day.  Remember that you need some fat in your diet. Ask your dietician what your daily goal should be.  Choose nonfat and low-fat healthy foods. Look for the words “nonfat,” “low fat,” or “fat free.”  As a guide, look on the label and choose foods with less than 3 g of fat per serving. Eat only one serving.  Avoid alcohol.  Do not smoke. If you need help quitting, talk with your health care provider.  Eat small frequent meals instead of three large heavy meals.      What foods can I eat?  Grains  Include healthy grains and starches such as potatoes, wheat bread, fiber-rich cereal, and brown rice. Choose whole grain options whenever possible. In adults, whole grains should account for 45-65% of your daily calories.  Fruits and Vegetables  Eat plenty of fruits and vegetables. Fresh fruits and vegetables add fiber to your diet.  Meats and Other Protein Sources  Eat lean meat such as chicken and pork. Trim any fat off of meat before cooking it. Eggs, fish, and beans are other sources of protein. In adults, these foods should account for 10-35% of your daily calories.  Dairy  Choose low-fat milk and dairy options. Dairy includes fat and protein, as well as calcium.  Fats and Oils  Limit high-fat foods such as fried foods, sweets, baked goods, sugary drinks.  Other  Creamy sauces and condiments, such as mayonnaise, can add extra fat. Think about whether or not you need to use them, or use smaller amounts or low fat options.      What foods are not recommended?  High fat foods, such as:  Baked goods.  Ice cream.  [place]ian toast.  Sweet rolls.  Pizza.  Cheese bread.  Foods covered with batter, butter, creamy sauces, or cheese.  Fried foods.  Sugary drinks and desserts.  Foods that cause gas or bloating  This information is not  intended to replace advice given to you by your health care provider. Make sure you discuss any questions you have with your health care provider.  Document Released: 12/23/2014 Document Revised: 05/25/2017 Document Reviewed: 12/01/2014  Elsevier Interactive Patient Education © 2017 Elsevier Inc.

## 2023-04-13 NOTE — PLAN OF CARE
Problem: Adult Inpatient Plan of Care  Goal: Plan of Care Review  Flowsheets (Taken 4/13/2023 1244)  Outcome Summary: PT VSS. Pt ambulates through hooper. IV flagyl & rocephin d/c'd. Pt will be discharged today.   Plan of Care Review  Outcome Summary: PT VSS. Pt ambulates through hooper. IV flagyl & rocephin d/c'd. Pt will be discharged today.

## 2023-04-17 ASSESSMENT — PAIN SCALES - GENERAL: PAIN_LEVEL: 0

## 2023-04-18 NOTE — ANESTHESIA POSTPROCEDURE EVALUATION
Patient: Karon Moore    Procedure Summary     Date: 04/11/23 Room / Location:  OR 9 / PH OR    Anesthesia Start: 1310 Anesthesia Stop: 1511    Procedure: CHOLECYSTECTOMY LAPAROSCOPIC (Abdomen) Diagnosis:       Acute calculous cholecystitis      (Acute calculous cholecystitis [K80.00])    Surgeons: Ashlie Rangel MD Responsible Provider: Devon Daily MD    Anesthesia Type: general ASA Status: 2          Anesthesia Type: general  PACU Vitals  4/11/2023 1506 - 4/11/2023 1606      4/11/2023  1510 4/11/2023  1515 4/11/2023  1530 4/11/2023  1550    BP: 128/72 133/75 135/81 146/83    Temp: 35.8 °C (96.4 °F) -- 36.1 °C (97 °F) 36.4 °C (97.6 °F)    Pulse: 88 84 76 69    Resp: 14 26 25 18    SpO2: -- 98 % 98 % 100 %              4/11/2023 1605             BP: 151/86       Temp: --       Pulse: --       Resp: --       SpO2: 100 %               Anesthesia Post Evaluation    Pain score: 0  Pain management: adequate  Mode of pain management: IV medication  Patient location during evaluation: PACU  Patient participation: complete - patient participated  Level of consciousness: awake and alert  Cardiovascular status: acceptable  Airway Patency: adequate  Respiratory status: acceptable  Hydration status: acceptable  Anesthetic complications: no

## 2023-04-28 NOTE — DISCHARGE SUMMARY
Inpatient Discharge Summary    BRIEF OVERVIEW  Admitting Provider: Ashlie Rangel MD  Discharge Provider: Ashlie Rangel MD  Primary Care Physician at Discharge: [place], No Pcp [phone]     Admission Date: 4/9/2023     Discharge Date: 4/13/23    Primary Discharge Diagnosis  Acute calculous cholecystitis    Secondary Discharge Diagnosis  n/a    Discharge Disposition  Home   Code Status at Discharge: Prior    Discharge Medications     Medication List      START taking these medications    ondansetron 4 mg tablet  Commonly known as: ZOFRAN  Take 1 tablet (4 mg total) by mouth every 8 (eight) hours as needed for nausea or vomiting for up to 7 days.  Dose: 4 mg        ASK your doctor about these medications    oxyCODONE-acetaminophen 5-325 mg per tablet  Commonly known as: PERCOCET  Take 1 tablet by mouth every 4 (four) hours as needed for moderate pain or severe pain for up to 5 days.  Dose: 1 tablet  Ask about: Should I take this medication?            Active Issues Requiring Follow-up  n/a    Outpatient Follow-Up  Encounter Information    This patient does not currently have any appointments scheduled.     Follow-Up:  1. Call the office of your surgeon to schedule a follow-up appointment for about 2 weeks from your surgery:  ? Ashlie Rangel MD, FACS  ? General Surgery  ? [phone]  ? [address] [facility] [place] [facility] [address]  ? [place] [address]      Referrals and Follow-ups to Schedule     Lifting Restrictions (Specify)      Maximum Weigth to Lift: 10 Pounds          Test Results Pending at Discharge  Unresulted Labs (From admission, onward)    None          DETAILS OF HOSPITAL STAY    Presenting Problem/History of Present Illness  Cholecystitis [K81.9]  Acute calculous cholecystitis [K80.00]  Karon Moore is a 52 y.o. female visiting from [facility] with a past medical history including microinvasive lymph node positive, ER/MI pos invasive ductal/lobular breast cancer, for which she underwent bilateral mastectomy,  chemotherapy, and adjuvant endocrine therapy (currently on tamoxifen) 8 years ago at [facility], who developed generalized abdominal pain, discomfort, bloating, vomiting which then became more of a band-like pressure across her upper abdomen starting 2 days ago and progressively worsening. She ultimately came to the ED where she was found to have mild leukocytosis and signs of early acute calculous cholecystitis on both CT a/p and ultrasound. She continues to have discomfort this a.m. but is presently controlled with pain meds. She has had previous similar symptoms.    Operative Procedures Performed  Procedure(s):  CHOLECYSTECTOMY LAPAROSCOPIC      Hospital Course  The patient was admitted to the surgical service. She was taken to the operating room on 4/11/23 for laparoscopic cholecystectomy. Gallbladder was acutely inflamed. Postoperatively, she had significant pain from pneumoperitoneum, but ultimately this was controlled. She tolerated low fat diet without issue. She was deemed stable for discharge to home on 4/12/23.     Discharge Disposition  Disposition: Home   Destination: Home

## 2024-02-06 ENCOUNTER — APPOINTMENT (OUTPATIENT)
Dept: OTOLARYNGOLOGY | Facility: CLINIC | Age: 54
End: 2024-02-06
Payer: COMMERCIAL

## 2024-02-06 VITALS
HEIGHT: 65 IN | DIASTOLIC BLOOD PRESSURE: 87 MMHG | WEIGHT: 150 LBS | SYSTOLIC BLOOD PRESSURE: 129 MMHG | BODY MASS INDEX: 24.99 KG/M2 | OXYGEN SATURATION: 98 % | HEART RATE: 70 BPM

## 2024-02-06 DIAGNOSIS — Z80.9 FAMILY HISTORY OF MALIGNANT NEOPLASM, UNSPECIFIED: ICD-10-CM

## 2024-02-06 DIAGNOSIS — Z78.9 OTHER SPECIFIED HEALTH STATUS: ICD-10-CM

## 2024-02-06 DIAGNOSIS — D16.4 BENIGN NEOPLASM OF BONES OF SKULL AND FACE: ICD-10-CM

## 2024-02-06 DIAGNOSIS — Z82.49 FAMILY HISTORY OF ISCHEMIC HEART DISEASE AND OTHER DISEASES OF THE CIRCULATORY SYSTEM: ICD-10-CM

## 2024-02-06 DIAGNOSIS — J34.89 OTHER SPECIFIED DISORDERS OF NOSE AND NASAL SINUSES: ICD-10-CM

## 2024-02-06 DIAGNOSIS — Z85.9 PERSONAL HISTORY OF MALIGNANT NEOPLASM, UNSPECIFIED: ICD-10-CM

## 2024-02-06 DIAGNOSIS — J04.0 ACUTE LARYNGITIS: ICD-10-CM

## 2024-02-06 DIAGNOSIS — Z83.3 FAMILY HISTORY OF DIABETES MELLITUS: ICD-10-CM

## 2024-02-06 DIAGNOSIS — K21.9 ACUTE LARYNGITIS: ICD-10-CM

## 2024-02-06 PROBLEM — Z00.00 ENCOUNTER FOR PREVENTIVE HEALTH EXAMINATION: Status: ACTIVE | Noted: 2024-02-06

## 2024-02-06 PROCEDURE — 99203 OFFICE O/P NEW LOW 30 MIN: CPT | Mod: 25

## 2024-02-06 PROCEDURE — 31575 DIAGNOSTIC LARYNGOSCOPY: CPT

## 2024-02-06 NOTE — PROCEDURE
[Globus] : globus [Topical Lidocaine] : topical lidocaine [Oxymetazoline HCl] : oxymetazoline HCl [Flexible Endoscope] : examined with the flexible endoscope [Normal] : the false vocal folds were pink and regular, the ventricular sulcus was open, the true vocal folds were glistening white, tense and of equal length, mobility, and height [Interarytenoid Edema] : interarytenoid area edematous [Serial Number: ___] : Serial Number: [unfilled] [de-identified] : done to visualize npx, hypopharynx due to l otalgia mild iah cw rl negative...

## 2024-02-06 NOTE — HISTORY OF PRESENT ILLNESS
[de-identified] : 52 yo f c/o intermittent pain in l ear for all of last year, it throbs, comes and goes. She had abx from urgent care 1-2 x saw a dentist and told nothing wrong - she then went to Dr Cardoza and sent here. She does not clench or grind her teeth. She has not been diagnosed with tmj. Nonsmoker. Has nasal congestion every night. Does not lateralize. Gets headaches from perfumes and burning eyes. Denies hearing changes. No fh relevant to cc, nonsmoker. H/o breast ca 10 yrs ago.

## 2024-02-06 NOTE — DATA REVIEWED
[de-identified] : neck ct- l max sinus polypoid mass, ethmoid small osteoma o/w nl images and report reviewed with pt

## 2024-02-06 NOTE — ASSESSMENT
[FreeTextEntry1] : 1) l otalgia possible tmd - try soft diet, nsaids, avoid bruxism 2) h/o breast ca with otalgia and source not clear - mri 3) polypoid l maxillary mass same side as otalgia- ct sinus ordered rtc with tests

## 2024-02-06 NOTE — PHYSICAL EXAM
[Laryngoscopy Performed] : laryngoscopy was performed, see procedure section for findings [Normal] : no rashes [de-identified] : possible mild acute AS [de-identified] : gait steady, prominent masseter muscles

## 2024-02-21 ENCOUNTER — APPOINTMENT (OUTPATIENT)
Dept: OTOLARYNGOLOGY | Facility: CLINIC | Age: 54
End: 2024-02-21
Payer: COMMERCIAL

## 2024-02-21 VITALS
DIASTOLIC BLOOD PRESSURE: 89 MMHG | HEART RATE: 92 BPM | SYSTOLIC BLOOD PRESSURE: 135 MMHG | TEMPERATURE: 98 F | OXYGEN SATURATION: 97 % | WEIGHT: 150 LBS | HEIGHT: 65 IN | BODY MASS INDEX: 24.99 KG/M2

## 2024-02-21 DIAGNOSIS — R09.81 NASAL CONGESTION: ICD-10-CM

## 2024-02-21 DIAGNOSIS — H92.02 OTALGIA, LEFT EAR: ICD-10-CM

## 2024-02-21 PROCEDURE — 99213 OFFICE O/P EST LOW 20 MIN: CPT

## 2024-02-27 ENCOUNTER — APPOINTMENT (OUTPATIENT)
Dept: NEUROSURGERY | Facility: CLINIC | Age: 54
End: 2024-02-27
Payer: COMMERCIAL

## 2024-02-27 VITALS
HEART RATE: 76 BPM | RESPIRATION RATE: 18 BRPM | BODY MASS INDEX: 24.99 KG/M2 | OXYGEN SATURATION: 98 % | HEIGHT: 65 IN | SYSTOLIC BLOOD PRESSURE: 125 MMHG | WEIGHT: 150 LBS | DIASTOLIC BLOOD PRESSURE: 88 MMHG

## 2024-02-27 PROCEDURE — 99204 OFFICE O/P NEW MOD 45 MIN: CPT

## 2024-02-27 RX ORDER — IBUPROFEN 800 MG/1
TABLET, FILM COATED ORAL
Refills: 0 | Status: ACTIVE | COMMUNITY

## 2024-02-27 NOTE — HISTORY OF PRESENT ILLNESS
[de-identified] : 53 year old female initially presented to Dr. Bailey with a year of intermittent left ear pain. She was found to have an incidental left IAC lesion.  She has a history of ER, MS, HER2+ breast cancer  requiring a double mastectomy followed by tamoxifen and aromatase inhibitor and lupron therapy. She has been in remission for 10 years.   She reports noticing mild left lower facial weakness and diminished sensation over the last 4 months.

## 2024-02-27 NOTE — PHYSICAL EXAM
[General Appearance - Alert] : alert [Person] : oriented to person [Time] : oriented to time [Cranial Nerves Optic (II)] : visual acuity intact bilaterally,  pupils equal round and reactive to light [Cranial Nerves Oculomotor (III)] : extraocular motion intact [Cranial Nerves Trigeminal (V)] : facial sensation intact symmetrically [Cranial Nerves Vestibulocochlear (VIII)] : hearing was intact bilaterally [Cranial Nerves Glossopharyngeal (IX)] : tongue and palate midline [Cranial Nerves Hypoglossal (XII)] : there was no tongue deviation with protrusion [Motor Tone] : muscle tone was normal in all four extremities [Cranial Nerves Accessory (XI - Cranial And Spinal)] : head turning and shoulder shrug symmetric [Motor Strength] : muscle strength was normal in all four extremities [Sensation Tactile Decrease] : light touch was intact [Balance] : balance was intact [Intact] : all sensory within normal limits bilaterally [Sclera] : the sclera and conjunctiva were normal [Extraocular Movements] : extraocular movements were intact [PERRL With Normal Accommodation] : pupils were equal in size, round, reactive to light, with normal accommodation [Full Visual Field] : full visual field [Abnormal Walk] : normal gait [FreeTextEntry5] : left V3 asymmetry, left hemifacial hypoesthesia

## 2024-02-27 NOTE — ASSESSMENT
[FreeTextEntry1] : My impression is that the patient suffers from an incidental left IAC lesion .   The patients established problem of  left sided ear pain is unrelated.  The differential diagnosis is schwannoma vs meningioma.   I had a long discussion with the patient regarding the role of continued observation with serial follow up.  The patient was extensively educated about the nature of her disease process. Therapeutic and diagnostic tests include MRI IAC in 3-6 months.  The patient should continue to see Dr. Bailey for her ear pain and will refer her to neurology for facial weakness/hypoesthesia.  I will see the patient back in February 2025 with a new MRI IAC. I have explained the alternatives, risks and benefits to the patient and she understands and agrees to proceed.

## 2024-04-19 ENCOUNTER — APPOINTMENT (OUTPATIENT)
Dept: OTOLARYNGOLOGY | Facility: CLINIC | Age: 54
End: 2024-04-19
Payer: COMMERCIAL

## 2024-04-19 VITALS
OXYGEN SATURATION: 98 % | WEIGHT: 150 LBS | HEART RATE: 75 BPM | TEMPERATURE: 97.3 F | HEIGHT: 65 IN | SYSTOLIC BLOOD PRESSURE: 125 MMHG | DIASTOLIC BLOOD PRESSURE: 85 MMHG | BODY MASS INDEX: 24.99 KG/M2

## 2024-04-19 DIAGNOSIS — J30.1 ALLERGIC RHINITIS DUE TO POLLEN: ICD-10-CM

## 2024-04-19 DIAGNOSIS — H69.90 UNSPECIFIED EUSTACHIAN TUBE DISORDER, UNSPECIFIED EAR: ICD-10-CM

## 2024-04-19 DIAGNOSIS — R68.89 OTHER GENERAL SYMPTOMS AND SIGNS: ICD-10-CM

## 2024-04-19 DIAGNOSIS — D32.0 BENIGN NEOPLASM OF CEREBRAL MENINGES: ICD-10-CM

## 2024-04-19 DIAGNOSIS — H92.02 OTALGIA, LEFT EAR: ICD-10-CM

## 2024-04-19 PROCEDURE — 92567 TYMPANOMETRY: CPT

## 2024-04-19 PROCEDURE — 92557 COMPREHENSIVE HEARING TEST: CPT

## 2024-04-19 PROCEDURE — 31231 NASAL ENDOSCOPY DX: CPT

## 2024-04-19 PROCEDURE — 99214 OFFICE O/P EST MOD 30 MIN: CPT | Mod: 25

## 2024-04-19 RX ORDER — FLUTICASONE PROPIONATE 50 UG/1
50 SPRAY, METERED NASAL DAILY
Qty: 1 | Refills: 3 | Status: ACTIVE | COMMUNITY
Start: 2024-04-19 | End: 1900-01-01

## (undated) DEVICE — TUBING INSUFFLATION PNEUMOSURE HIGH FLOW

## (undated) DEVICE — APPLIER ENDO CLIP III 5MM

## (undated) DEVICE — TROCAR 1ST ENTRY 5 X 100MM ADV Z-THREAD

## (undated) DEVICE — SUTURE BIOSYN 4-0 UNDYED 1X18 P-12

## (undated) DEVICE — PARTICLES HEMOSTATIC ARISTA 3 GRAM

## (undated) DEVICE — CORD LAPAROSCOPIC DISP STERILE

## (undated) DEVICE — APPLICATOR CHLORAPREP 26ML ORANGE TINT

## (undated) DEVICE — EVACUATOR PLUME-AWAY LAP SMOKE PKG

## (undated) DEVICE — TIPS SCISSOR MICROLINE LAP

## (undated) DEVICE — TIP BOVIE BLADE COATED INSULATED 2.75IN

## (undated) DEVICE — SLEEVE SCD COMFORT KNEE LENGTH MED

## (undated) DEVICE — SOLN IRRIG .9%SOD 1000ML

## (undated) DEVICE — SPECIMN RETV DVC CATCH10 RELIACATCH 10MM

## (undated) DEVICE — SUTURE VICRYL 3-0 J416H SH UNDYED

## (undated) DEVICE — PACK RFID LAP CHOLE PMH

## (undated) DEVICE — ***USE 140986*** DRAPE LAPAROTOMY W/POUCHES STE

## (undated) DEVICE — PENEVAC1 NONSTICK SMOKE EVAC

## (undated) DEVICE — MANIFOLD FOUR PORT NEPTUNE

## (undated) DEVICE — MASTISOL LIQUID ADHESIVE

## (undated) DEVICE — STRYKEFLOW 2 W/ DISP TIP

## (undated) DEVICE — SOLN IRRIG STER WATER 1000ML

## (undated) DEVICE — TROCAR SLEEVE 5MM

## (undated) DEVICE — APPLICATOR ARISTA FLEXITIP XL 38CM

## (undated) DEVICE — TROCAR BLUNT TIP 12 X 100MM

## (undated) DEVICE — TOWEL SURGICAL W17XL27IN BLUE COTTON STANDARD PREWASHED DELI

## (undated) DEVICE — SUTURE POLYSORB 0 VIOLET 1X30 GU-46

## (undated) DEVICE — MANIFOLD SINGLE PORT NEPTUNE

## (undated) DEVICE — ELECTRODE CAUTERY E-Z CLEAN DISP

## (undated) DEVICE — PAD GROUND ELECTROSURGICAL W/CORD